# Patient Record
Sex: FEMALE | Race: WHITE | NOT HISPANIC OR LATINO | URBAN - METROPOLITAN AREA
[De-identification: names, ages, dates, MRNs, and addresses within clinical notes are randomized per-mention and may not be internally consistent; named-entity substitution may affect disease eponyms.]

---

## 2018-03-01 ENCOUNTER — OUTPATIENT (OUTPATIENT)
Dept: OUTPATIENT SERVICES | Facility: HOSPITAL | Age: 38
LOS: 1 days | End: 2018-03-01
Payer: MEDICAID

## 2018-03-01 PROCEDURE — G9001: CPT

## 2018-03-06 DIAGNOSIS — R69 ILLNESS, UNSPECIFIED: ICD-10-CM

## 2019-01-18 ENCOUNTER — INPATIENT (INPATIENT)
Facility: HOSPITAL | Age: 39
LOS: 4 days | Discharge: HOME | DRG: 871 | End: 2019-01-23
Attending: FAMILY MEDICINE | Admitting: FAMILY MEDICINE
Payer: MEDICAID

## 2019-01-18 VITALS
OXYGEN SATURATION: 94 % | HEART RATE: 130 BPM | SYSTOLIC BLOOD PRESSURE: 153 MMHG | DIASTOLIC BLOOD PRESSURE: 77 MMHG | WEIGHT: 190.04 LBS | RESPIRATION RATE: 18 BRPM | TEMPERATURE: 101 F

## 2019-01-18 DIAGNOSIS — J18.9 PNEUMONIA, UNSPECIFIED ORGANISM: ICD-10-CM

## 2019-01-18 LAB
ALBUMIN SERPL ELPH-MCNC: 2.5 G/DL — LOW (ref 3.3–5)
ALP SERPL-CCNC: 208 U/L — HIGH (ref 40–120)
ALT FLD-CCNC: 95 U/L — HIGH (ref 12–78)
AMYLASE P1 CFR SERPL: 92 U/L — SIGNIFICANT CHANGE UP (ref 25–115)
ANION GAP SERPL CALC-SCNC: 7 MMOL/L — SIGNIFICANT CHANGE UP (ref 5–17)
APTT BLD: 38.2 SEC — HIGH (ref 28.5–37)
AST SERPL-CCNC: 155 U/L — HIGH (ref 15–37)
BASOPHILS # BLD AUTO: 0.08 K/UL — SIGNIFICANT CHANGE UP (ref 0–0.2)
BASOPHILS NFR BLD AUTO: 0.6 % — SIGNIFICANT CHANGE UP (ref 0–2)
BILIRUB SERPL-MCNC: 1.9 MG/DL — HIGH (ref 0.2–1.2)
BUN SERPL-MCNC: 11 MG/DL — SIGNIFICANT CHANGE UP (ref 7–23)
CALCIUM SERPL-MCNC: 8.9 MG/DL — SIGNIFICANT CHANGE UP (ref 8.5–10.1)
CHLORIDE SERPL-SCNC: 107 MMOL/L — SIGNIFICANT CHANGE UP (ref 96–108)
CO2 SERPL-SCNC: 26 MMOL/L — SIGNIFICANT CHANGE UP (ref 22–31)
CREAT SERPL-MCNC: 0.77 MG/DL — SIGNIFICANT CHANGE UP (ref 0.5–1.3)
EOSINOPHIL # BLD AUTO: 0.01 K/UL — SIGNIFICANT CHANGE UP (ref 0–0.5)
EOSINOPHIL NFR BLD AUTO: 0.1 % — SIGNIFICANT CHANGE UP (ref 0–6)
FLU A RESULT: SIGNIFICANT CHANGE UP
FLU A RESULT: SIGNIFICANT CHANGE UP
FLUAV AG NPH QL: SIGNIFICANT CHANGE UP
FLUBV AG NPH QL: SIGNIFICANT CHANGE UP
GLUCOSE SERPL-MCNC: 104 MG/DL — HIGH (ref 70–99)
HCG SERPL-ACNC: <1 MIU/ML — SIGNIFICANT CHANGE UP
HCT VFR BLD CALC: 42.9 % — SIGNIFICANT CHANGE UP (ref 34.5–45)
HGB BLD-MCNC: 14.6 G/DL — SIGNIFICANT CHANGE UP (ref 11.5–15.5)
IMM GRANULOCYTES NFR BLD AUTO: 0.8 % — SIGNIFICANT CHANGE UP (ref 0–1.5)
INR BLD: 1.34 RATIO — HIGH (ref 0.88–1.16)
LACTATE SERPL-SCNC: 1.2 MMOL/L — SIGNIFICANT CHANGE UP (ref 0.7–2)
LIDOCAIN IGE QN: 227 U/L — SIGNIFICANT CHANGE UP (ref 73–393)
LYMPHOCYTES # BLD AUTO: 1.28 K/UL — SIGNIFICANT CHANGE UP (ref 1–3.3)
LYMPHOCYTES # BLD AUTO: 9.7 % — LOW (ref 13–44)
MCHC RBC-ENTMCNC: 34 GM/DL — SIGNIFICANT CHANGE UP (ref 32–36)
MCHC RBC-ENTMCNC: 34 PG — SIGNIFICANT CHANGE UP (ref 27–34)
MCV RBC AUTO: 100 FL — SIGNIFICANT CHANGE UP (ref 80–100)
MONOCYTES # BLD AUTO: 1.12 K/UL — HIGH (ref 0–0.9)
MONOCYTES NFR BLD AUTO: 8.5 % — SIGNIFICANT CHANGE UP (ref 2–14)
NEUTROPHILS # BLD AUTO: 10.65 K/UL — HIGH (ref 1.8–7.4)
NEUTROPHILS NFR BLD AUTO: 80.3 % — HIGH (ref 43–77)
PLATELET # BLD AUTO: 144 K/UL — LOW (ref 150–400)
POTASSIUM SERPL-MCNC: 4.3 MMOL/L — SIGNIFICANT CHANGE UP (ref 3.5–5.3)
POTASSIUM SERPL-SCNC: 4.3 MMOL/L — SIGNIFICANT CHANGE UP (ref 3.5–5.3)
PROCALCITONIN SERPL-MCNC: 0.89 NG/ML — HIGH (ref 0–0.04)
PROT SERPL-MCNC: 7.8 G/DL — SIGNIFICANT CHANGE UP (ref 6–8.3)
PROTHROM AB SERPL-ACNC: 15.4 SEC — HIGH (ref 10–12.9)
RBC # BLD: 4.29 M/UL — SIGNIFICANT CHANGE UP (ref 3.8–5.2)
RBC # FLD: 13.6 % — SIGNIFICANT CHANGE UP (ref 10.3–14.5)
RSV RESULT: SIGNIFICANT CHANGE UP
RSV RNA RESP QL NAA+PROBE: SIGNIFICANT CHANGE UP
SODIUM SERPL-SCNC: 140 MMOL/L — SIGNIFICANT CHANGE UP (ref 135–145)
WBC # BLD: 13.24 K/UL — HIGH (ref 3.8–10.5)
WBC # FLD AUTO: 13.24 K/UL — HIGH (ref 3.8–10.5)

## 2019-01-18 PROCEDURE — 99285 EMERGENCY DEPT VISIT HI MDM: CPT

## 2019-01-18 PROCEDURE — 71045 X-RAY EXAM CHEST 1 VIEW: CPT | Mod: 26

## 2019-01-18 PROCEDURE — 93010 ELECTROCARDIOGRAM REPORT: CPT

## 2019-01-18 PROCEDURE — 74176 CT ABD & PELVIS W/O CONTRAST: CPT | Mod: 26

## 2019-01-18 PROCEDURE — 76705 ECHO EXAM OF ABDOMEN: CPT | Mod: 26

## 2019-01-18 PROCEDURE — 99223 1ST HOSP IP/OBS HIGH 75: CPT | Mod: GC,AI

## 2019-01-18 RX ORDER — VANCOMYCIN HCL 1 G
1000 VIAL (EA) INTRAVENOUS ONCE
Qty: 0 | Refills: 0 | Status: COMPLETED | OUTPATIENT
Start: 2019-01-18 | End: 2019-01-18

## 2019-01-18 RX ORDER — ONDANSETRON 8 MG/1
4 TABLET, FILM COATED ORAL ONCE
Qty: 0 | Refills: 0 | Status: COMPLETED | OUTPATIENT
Start: 2019-01-18 | End: 2019-01-18

## 2019-01-18 RX ORDER — PIPERACILLIN AND TAZOBACTAM 4; .5 G/20ML; G/20ML
3.38 INJECTION, POWDER, LYOPHILIZED, FOR SOLUTION INTRAVENOUS ONCE
Qty: 0 | Refills: 0 | Status: COMPLETED | OUTPATIENT
Start: 2019-01-18 | End: 2019-01-18

## 2019-01-18 RX ORDER — PANTOPRAZOLE SODIUM 20 MG/1
40 TABLET, DELAYED RELEASE ORAL ONCE
Qty: 0 | Refills: 0 | Status: COMPLETED | OUTPATIENT
Start: 2019-01-18 | End: 2019-01-18

## 2019-01-18 RX ORDER — SODIUM CHLORIDE 9 MG/ML
2600 INJECTION, SOLUTION INTRAVENOUS ONCE
Qty: 0 | Refills: 0 | Status: COMPLETED | OUTPATIENT
Start: 2019-01-18 | End: 2019-01-18

## 2019-01-18 RX ORDER — ACETAMINOPHEN 500 MG
650 TABLET ORAL ONCE
Qty: 0 | Refills: 0 | Status: COMPLETED | OUTPATIENT
Start: 2019-01-18 | End: 2019-01-18

## 2019-01-18 RX ADMIN — SODIUM CHLORIDE 2600 MILLILITER(S): 9 INJECTION, SOLUTION INTRAVENOUS at 19:27

## 2019-01-18 RX ADMIN — Medication 250 MILLIGRAM(S): at 21:44

## 2019-01-18 RX ADMIN — ONDANSETRON 4 MILLIGRAM(S): 8 TABLET, FILM COATED ORAL at 20:35

## 2019-01-18 RX ADMIN — PANTOPRAZOLE SODIUM 40 MILLIGRAM(S): 20 TABLET, DELAYED RELEASE ORAL at 20:35

## 2019-01-18 RX ADMIN — PIPERACILLIN AND TAZOBACTAM 3.38 GRAM(S): 4; .5 INJECTION, POWDER, LYOPHILIZED, FOR SOLUTION INTRAVENOUS at 21:35

## 2019-01-18 RX ADMIN — SODIUM CHLORIDE 2600 MILLILITER(S): 9 INJECTION, SOLUTION INTRAVENOUS at 23:26

## 2019-01-18 RX ADMIN — Medication 650 MILLIGRAM(S): at 23:26

## 2019-01-18 RX ADMIN — PIPERACILLIN AND TAZOBACTAM 200 GRAM(S): 4; .5 INJECTION, POWDER, LYOPHILIZED, FOR SOLUTION INTRAVENOUS at 19:39

## 2019-01-18 RX ADMIN — Medication 650 MILLIGRAM(S): at 19:39
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
## 2019-01-18 NOTE — H&P ADULT - ASSESSMENT
The patient is a 39 yo FM with PMH of alcohol abuse, polysubstance abuse, HTN, and Hepatitis C (untreated) who was sent from [facility] for fever and tachycardia. Admitted for PNA.

## 2019-01-18 NOTE — H&P ADULT - PROBLEM SELECTOR PLAN 1
-patient with imaging evidence of PNA and red emesis, possible aspiration and klebsiella PNA, also with history of abd pain in setting of etoh/drug abuse and HCV  -continue vanc/zosyn  -patient tachycardic, will monitor on remote tele -patient with imaging evidence of PNA and red emesis, possible aspiration and klebsiella PNA, also with history of abd pain in setting of etoh/drug abuse and HCV  -continue vanc/zosyn  -patient tachycardic, will monitor on remote tele  -75 cc/hr NS maintenance

## 2019-01-18 NOTE — ED ADULT NURSE NOTE - NS ED NURSE TRANSPORT WITH
Cardiac Monitor/Defib/ACLS/Rescue Kit/O2/BVM oxygen/Cardiac Monitor/Defib/ACLS/Rescue Kit/O2/BVM/pulse ox

## 2019-01-18 NOTE — H&P ADULT - PROBLEM SELECTOR PLAN 4
-addiction medicine consult - Dr. Jensen  -continue methadone 10 mg qAM  -dulcolax for constipation  -nicotine patch

## 2019-01-18 NOTE — H&P ADULT - PROBLEM SELECTOR PLAN 2
-on ativan 2 mg tab q8 at [facility], will continue  -Ativan PRN via CIWA protocol  -folic acid daily, multi vitamin.

## 2019-01-18 NOTE — H&P ADULT - PROBLEM SELECTOR PLAN 5
-patient with history of Hep C, initially complaining of abd pain  -LFT abnormalities, gall stones, and hepatosplenomegaly  -GI consult - [facility]  -f/u am lactulose  -PPI, tums, mylanta

## 2019-01-18 NOTE — H&P ADULT - NSHPSOCIALHISTORY_GEN_ALL_CORE
Lives with fiance and 3 children, Patient has a history of etoh, cocaine, heroin abuse. Ambulates independently. Smokes 1 PPD

## 2019-01-18 NOTE — ED PROVIDER NOTE - CONSTITUTIONAL, MLM
normal... Disheveled appearing obese white female, well nourished, lethargic in mild distress apparent distress.

## 2019-01-18 NOTE — ED PROVIDER NOTE - OBJECTIVE STATEMENT
37 yo white female with H/O Opiate and Alcohol Abuse, HTN and Hep C who was sent here from [facility] (where she has been in rehab for the past few days) for fever and rapid heart rate. Per aide with patient the only thing that she has noted was that patient has been coughing a little today. Patient was heavily sedated with Ativan today per EMS making history taking difficult. Patient at this time only seems to mumble that here abdomen hurts. Additional history at this time unavailable as a result of sedation.

## 2019-01-18 NOTE — ED PROVIDER NOTE - ENMT, MLM
Patient Education        Bacterial Vaginosis: Care Instructions  Your Care Instructions    Bacterial vaginosis is a type of vaginal infection. It is caused by excess growth of certain bacteria that are normally found in the vagina. Symptoms can include itching, swelling, pain when you urinate or have sex, and a gray or yellow discharge with a \"fishy\" odor. It is not considered an infection that is spread through sexual contact. Although symptoms can be annoying and uncomfortable, bacterial vaginosis does not usually cause other health problems. However, if you have it while you are pregnant, it can cause complications. While the infection may go away on its own, most doctors use antibiotics to treat it. You may have been prescribed pills or vaginal cream. With treatment, bacterial vaginosis usually clears up in 5 to 7 days. Follow-up care is a key part of your treatment and safety. Be sure to make and go to all appointments, and call your doctor if you are having problems. It's also a good idea to know your test results and keep a list of the medicines you take. How can you care for yourself at home? · Take your antibiotics as directed. Do not stop taking them just because you feel better. You need to take the full course of antibiotics. · Do not eat or drink anything that contains alcohol if you are taking metronidazole (Flagyl). · Keep using your medicine if you start your period. Use pads instead of tampons while using a vaginal cream or suppository. Tampons can absorb the medicine. · Wear loose cotton clothing. Do not wear nylon and other materials that hold body heat and moisture close to the skin. · Do not scratch. Relieve itching with a cold pack or a cool bath. · Do not wash your vaginal area more than once a day. Use plain water or a mild, unscented soap. Do not douche. When should you call for help?   Watch closely for changes in your health, and be sure to contact your doctor if:    · You have unexpected vaginal bleeding.     · You have a fever.     · You have new or increased pain in your vagina or pelvis.     · You are not getting better after 1 week.     · Your symptoms return after you finish the course of your medicine. Where can you learn more? Go to http://dee-chuy.info/. Kevin Harrell in the search box to learn more about \"Bacterial Vaginosis: Care Instructions. \"  Current as of: May 15, 2018  Content Version: 11.8  © 0205-9983 [employer]. Care instructions adapted under license by [employer] (which disclaims liability or warranty for this information). If you have questions about a medical condition or this instruction, always ask your healthcare professional. [address] any warranty or liability for your use of this information. Patient Education        Urinary Tract Infection in Pregnancy: Care Instructions  Your Care Instructions    A urinary tract infection, or UTI, is an infection of the bladder and other urinary structures. Most UTIs occur in the bladder. They often cause pain or burning when you urinate. UTI is the most common bacterial infection in pregnancy. If untreated, a UTI could lead to problems such as a kidney infection or  labor. Most UTIs can be cured with antibiotics. Your doctor will prescribe an antibiotic that is safe during pregnancy. Be sure to finish your medicine so that the infection does not spread to your kidneys. Follow-up care is a key part of your treatment and safety. Be sure to make and go to all appointments, and call your doctor if you are having problems. It's also a good idea to know your test results and keep a list of the medicines you take. How can you care for yourself at home? · Take your antibiotics as directed. Do not stop taking them just because you feel better. You need to take the full course of antibiotics.   · Drink extra water and other fluids for the next day or two. This will help wash out the bacteria causing the infection. If you have kidney, heart, or liver disease and have to limit fluids, talk with your doctor before you increase the amount of fluids you drink. · Do not drink alcohol. · Urinate often. Try to empty your bladder each time. Preventing UTIs  · Drink plenty of fluids, enough so that your urine is light yellow or clear like water. This helps you urinate often, which clears bacteria from your system. If you have kidney, heart, or liver disease and have to limit fluids, talk with your doctor before you increase the amount of fluids you drink. · Urinate when you first have the urge. · Urinate right after you have sex. This is the best way for women to avoid UTIs. · When going to the bathroom, wipe from front to back to keep bacteria from entering the vagina or urethra. When should you call for help? Call your doctor now or seek immediate medical care if:    · You have symptoms of a worse urinary tract infection. These may include:  ? Pain or burning when you urinate. ? A frequent need to urinate without being able to pass much urine. ? Pain in the flank, which is just below the rib cage and above the waist on either side of the back. ? Blood in your urine. ? A fever.    Watch closely for changes in your health, and be sure to contact your doctor if:    · You do not get better as expected. Where can you learn more? Go to http://dee-chuy.info/. Enter M982 in the search box to learn more about \"Urinary Tract Infection in Pregnancy: Care Instructions. \"  Current as of: November 21, 2017  Content Version: 11.8  © 4894-6695 [employer]. Care instructions adapted under license by [employer] (which disclaims liability or warranty for this information).  If you have questions about a medical condition or this instruction, always ask your healthcare professional. [employer] any warranty or liability for your use of this information. [employer]. Poor oral hygiene

## 2019-01-18 NOTE — H&P ADULT - NSHPPHYSICALEXAM_GEN_ALL_CORE
Physical Exam:  General: lethargic, sedated, unkempt and disheveled female, no acute distress  HEENT: normocephallic atraumatic, PERRLA, EOMI b/l, moist mucous membranes   Neck: supple, nontender, no mass  Neurology: AOx1, sensation intact  Respiratory: decreased BS, no wheezes, rales, or rhonchi  CV: regular rate and rhythm, no murmurs, rubs or gallops  Abdominal: obese, soft, non-tender, decreased bowel sounds  Extremities: No clubbing, cyanosis or edema, positive peripheral pulses  Musculoskeletal: normal range of motion, no joint erythema or warmth, no joint swelling   Skin: multiple excoriations, acne, diffuse scaring, warm, dry Physical Exam:  General: lethargic, sedated, unkempt and disheveled female, no acute distress  HEENT: perioral/chapped/cracked lips, normocephallic atraumatic, PERRLA, EOMI b/l, moist mucous membranes   Neck: supple, nontender, no mass  Neurology: AOx1, sensation intact  Respiratory: decreased BS, no wheezes, rales, or rhonchi  CV: regular rate and rhythm, no murmurs, rubs or gallops  Abdominal: obese, soft, non-tender, decreased bowel sounds  Extremities: No clubbing, cyanosis or edema, positive peripheral pulses  Musculoskeletal: normal range of motion, no joint erythema or warmth, no joint swelling   Skin: multiple excoriations, acne, diffuse scaring, warm, dry

## 2019-01-18 NOTE — H&P ADULT - NSHPREVIEWOFSYSTEMS_GEN_ALL_CORE
ROS limited given patient mental status/sedation and refusal to cooperate with interviewer    Patient endorses red emesis x1, ED RN endorses black emesis x1  Patient denies cp, sob, wheeze, fever, chills, abd pain, diarrhea

## 2019-01-19 DIAGNOSIS — Z29.9 ENCOUNTER FOR PROPHYLACTIC MEASURES, UNSPECIFIED: ICD-10-CM

## 2019-01-19 DIAGNOSIS — I10 ESSENTIAL (PRIMARY) HYPERTENSION: ICD-10-CM

## 2019-01-19 DIAGNOSIS — B19.20 UNSPECIFIED VIRAL HEPATITIS C WITHOUT HEPATIC COMA: ICD-10-CM

## 2019-01-19 DIAGNOSIS — B18.2 CHRONIC VIRAL HEPATITIS C: ICD-10-CM

## 2019-01-19 DIAGNOSIS — J18.9 PNEUMONIA, UNSPECIFIED ORGANISM: ICD-10-CM

## 2019-01-19 DIAGNOSIS — F10.10 ALCOHOL ABUSE, UNCOMPLICATED: ICD-10-CM

## 2019-01-19 DIAGNOSIS — F19.10 OTHER PSYCHOACTIVE SUBSTANCE ABUSE, UNCOMPLICATED: ICD-10-CM

## 2019-01-19 LAB
ALBUMIN SERPL ELPH-MCNC: 2.3 G/DL — LOW (ref 3.3–5)
ALBUMIN SERPL ELPH-MCNC: 2.3 G/DL — LOW (ref 3.3–5)
ALP SERPL-CCNC: 160 U/L — HIGH (ref 40–120)
ALP SERPL-CCNC: 166 U/L — HIGH (ref 40–120)
ALT FLD-CCNC: 74 U/L — SIGNIFICANT CHANGE UP (ref 12–78)
ALT FLD-CCNC: 85 U/L — HIGH (ref 12–78)
AMMONIA BLD-MCNC: 81 UMOL/L — HIGH (ref 11–32)
ANION GAP SERPL CALC-SCNC: 4 MMOL/L — LOW (ref 5–17)
ANION GAP SERPL CALC-SCNC: 5 MMOL/L — SIGNIFICANT CHANGE UP (ref 5–17)
AST SERPL-CCNC: 106 U/L — HIGH (ref 15–37)
AST SERPL-CCNC: 124 U/L — HIGH (ref 15–37)
BILIRUB DIRECT SERPL-MCNC: 1.2 MG/DL — HIGH (ref 0.05–0.2)
BILIRUB INDIRECT FLD-MCNC: 0.5 MG/DL — SIGNIFICANT CHANGE UP (ref 0.2–1)
BILIRUB SERPL-MCNC: 1.6 MG/DL — HIGH (ref 0.2–1.2)
BILIRUB SERPL-MCNC: 1.7 MG/DL — HIGH (ref 0.2–1.2)
BUN SERPL-MCNC: 12 MG/DL — SIGNIFICANT CHANGE UP (ref 7–23)
BUN SERPL-MCNC: 14 MG/DL — SIGNIFICANT CHANGE UP (ref 7–23)
CALCIUM SERPL-MCNC: 8.5 MG/DL — SIGNIFICANT CHANGE UP (ref 8.5–10.1)
CALCIUM SERPL-MCNC: 8.6 MG/DL — SIGNIFICANT CHANGE UP (ref 8.5–10.1)
CHLORIDE SERPL-SCNC: 107 MMOL/L — SIGNIFICANT CHANGE UP (ref 96–108)
CHLORIDE SERPL-SCNC: 109 MMOL/L — HIGH (ref 96–108)
CO2 SERPL-SCNC: 28 MMOL/L — SIGNIFICANT CHANGE UP (ref 22–31)
CO2 SERPL-SCNC: 28 MMOL/L — SIGNIFICANT CHANGE UP (ref 22–31)
CREAT SERPL-MCNC: 0.66 MG/DL — SIGNIFICANT CHANGE UP (ref 0.5–1.3)
CREAT SERPL-MCNC: 0.7 MG/DL — SIGNIFICANT CHANGE UP (ref 0.5–1.3)
GLUCOSE SERPL-MCNC: 107 MG/DL — HIGH (ref 70–99)
GLUCOSE SERPL-MCNC: 99 MG/DL — SIGNIFICANT CHANGE UP (ref 70–99)
MAGNESIUM SERPL-MCNC: 1.7 MG/DL — SIGNIFICANT CHANGE UP (ref 1.6–2.6)
PHOSPHATE SERPL-MCNC: 3.3 MG/DL — SIGNIFICANT CHANGE UP (ref 2.5–4.5)
POTASSIUM SERPL-MCNC: 4.1 MMOL/L — SIGNIFICANT CHANGE UP (ref 3.5–5.3)
POTASSIUM SERPL-MCNC: 4.6 MMOL/L — SIGNIFICANT CHANGE UP (ref 3.5–5.3)
POTASSIUM SERPL-SCNC: 4.1 MMOL/L — SIGNIFICANT CHANGE UP (ref 3.5–5.3)
POTASSIUM SERPL-SCNC: 4.6 MMOL/L — SIGNIFICANT CHANGE UP (ref 3.5–5.3)
PROT SERPL-MCNC: 7.1 G/DL — SIGNIFICANT CHANGE UP (ref 6–8.3)
PROT SERPL-MCNC: 7.2 G/DL — SIGNIFICANT CHANGE UP (ref 6–8.3)
SODIUM SERPL-SCNC: 139 MMOL/L — SIGNIFICANT CHANGE UP (ref 135–145)
SODIUM SERPL-SCNC: 142 MMOL/L — SIGNIFICANT CHANGE UP (ref 135–145)

## 2019-01-19 PROCEDURE — 99233 SBSQ HOSP IP/OBS HIGH 50: CPT

## 2019-01-19 PROCEDURE — 93010 ELECTROCARDIOGRAM REPORT: CPT

## 2019-01-19 RX ORDER — PANTOPRAZOLE SODIUM 20 MG/1
40 TABLET, DELAYED RELEASE ORAL
Qty: 0 | Refills: 0 | Status: DISCONTINUED | OUTPATIENT
Start: 2019-01-19 | End: 2019-01-23

## 2019-01-19 RX ORDER — LACTULOSE 10 G/15ML
10 SOLUTION ORAL THREE TIMES A DAY
Qty: 0 | Refills: 0 | Status: DISCONTINUED | OUTPATIENT
Start: 2019-01-19 | End: 2019-01-19

## 2019-01-19 RX ORDER — LACTULOSE 10 G/15ML
SOLUTION ORAL
Qty: 0 | Refills: 0 | Status: DISCONTINUED | OUTPATIENT
Start: 2019-01-19 | End: 2019-01-19

## 2019-01-19 RX ORDER — FOLIC ACID 0.8 MG
1 TABLET ORAL DAILY
Qty: 0 | Refills: 0 | Status: DISCONTINUED | OUTPATIENT
Start: 2019-01-19 | End: 2019-01-19

## 2019-01-19 RX ORDER — NICOTINE POLACRILEX 2 MG
1 GUM BUCCAL DAILY
Qty: 0 | Refills: 0 | Status: DISCONTINUED | OUTPATIENT
Start: 2019-01-19 | End: 2019-01-23

## 2019-01-19 RX ORDER — LANOLIN ALCOHOL/MO/W.PET/CERES
3 CREAM (GRAM) TOPICAL ONCE
Qty: 0 | Refills: 0 | Status: COMPLETED | OUTPATIENT
Start: 2019-01-19 | End: 2019-01-19

## 2019-01-19 RX ORDER — SODIUM CHLORIDE 9 MG/ML
1000 INJECTION INTRAMUSCULAR; INTRAVENOUS; SUBCUTANEOUS
Qty: 0 | Refills: 0 | Status: DISCONTINUED | OUTPATIENT
Start: 2019-01-19 | End: 2019-01-20

## 2019-01-19 RX ORDER — VANCOMYCIN HCL 1 G
1000 VIAL (EA) INTRAVENOUS DAILY
Qty: 0 | Refills: 0 | Status: DISCONTINUED | OUTPATIENT
Start: 2019-01-19 | End: 2019-01-19

## 2019-01-19 RX ORDER — CALCIUM CARBONATE 500(1250)
1 TABLET ORAL DAILY
Qty: 0 | Refills: 0 | Status: DISCONTINUED | OUTPATIENT
Start: 2019-01-19 | End: 2019-01-23

## 2019-01-19 RX ORDER — METHADONE HYDROCHLORIDE 40 MG/1
10 TABLET ORAL
Qty: 0 | Refills: 0 | Status: DISCONTINUED | OUTPATIENT
Start: 2019-01-19 | End: 2019-01-22

## 2019-01-19 RX ORDER — PIPERACILLIN AND TAZOBACTAM 4; .5 G/20ML; G/20ML
3.38 INJECTION, POWDER, LYOPHILIZED, FOR SOLUTION INTRAVENOUS EVERY 8 HOURS
Qty: 0 | Refills: 0 | Status: DISCONTINUED | OUTPATIENT
Start: 2019-01-19 | End: 2019-01-22

## 2019-01-19 RX ORDER — LACTULOSE 10 G/15ML
10 SOLUTION ORAL ONCE
Qty: 0 | Refills: 0 | Status: COMPLETED | OUTPATIENT
Start: 2019-01-19 | End: 2019-01-19

## 2019-01-19 RX ORDER — LACTULOSE 10 G/15ML
10 SOLUTION ORAL THREE TIMES A DAY
Qty: 0 | Refills: 0 | Status: DISCONTINUED | OUTPATIENT
Start: 2019-01-19 | End: 2019-01-22

## 2019-01-19 RX ORDER — PETROLATUM,WHITE
1 JELLY (GRAM) TOPICAL
Qty: 0 | Refills: 0 | Status: DISCONTINUED | OUTPATIENT
Start: 2019-01-19 | End: 2019-01-23

## 2019-01-19 RX ADMIN — LACTULOSE 10 GRAM(S): 10 SOLUTION ORAL at 06:56

## 2019-01-19 RX ADMIN — PIPERACILLIN AND TAZOBACTAM 25 GRAM(S): 4; .5 INJECTION, POWDER, LYOPHILIZED, FOR SOLUTION INTRAVENOUS at 21:19

## 2019-01-19 RX ADMIN — Medication 2 MILLIGRAM(S): at 07:57

## 2019-01-19 RX ADMIN — LACTULOSE 10 GRAM(S): 10 SOLUTION ORAL at 21:19

## 2019-01-19 RX ADMIN — Medication 2 MILLIGRAM(S): at 06:26

## 2019-01-19 RX ADMIN — Medication 250 MILLIGRAM(S): at 11:52

## 2019-01-19 RX ADMIN — Medication 1 APPLICATION(S): at 06:26

## 2019-01-19 RX ADMIN — PIPERACILLIN AND TAZOBACTAM 25 GRAM(S): 4; .5 INJECTION, POWDER, LYOPHILIZED, FOR SOLUTION INTRAVENOUS at 13:44

## 2019-01-19 RX ADMIN — Medication 2 MILLIGRAM(S): at 13:44

## 2019-01-19 RX ADMIN — LACTULOSE 10 GRAM(S): 10 SOLUTION ORAL at 02:01

## 2019-01-19 RX ADMIN — Medication 1 PATCH: at 19:00

## 2019-01-19 RX ADMIN — Medication 2 MILLIGRAM(S): at 21:19

## 2019-01-19 RX ADMIN — PANTOPRAZOLE SODIUM 40 MILLIGRAM(S): 20 TABLET, DELAYED RELEASE ORAL at 06:26

## 2019-01-19 RX ADMIN — Medication 1 TABLET(S): at 11:52

## 2019-01-19 RX ADMIN — LACTULOSE 10 GRAM(S): 10 SOLUTION ORAL at 13:44

## 2019-01-19 RX ADMIN — Medication 3 MILLIGRAM(S): at 22:03

## 2019-01-19 RX ADMIN — SODIUM CHLORIDE 75 MILLILITER(S): 9 INJECTION INTRAMUSCULAR; INTRAVENOUS; SUBCUTANEOUS at 02:01

## 2019-01-19 RX ADMIN — METHADONE HYDROCHLORIDE 10 MILLIGRAM(S): 40 TABLET ORAL at 06:26

## 2019-01-19 RX ADMIN — Medication 1 PATCH: at 11:53

## 2019-01-19 NOTE — CONSULT NOTE ADULT - PROBLEM SELECTOR RECOMMENDATION 9
pt needs urine tox - to screen for recr drugs, on Ativan for ETOH w/d and LUKE  ciwa scale and prn BZD / vitamins, MVI will suffice, nutrition  [facility] - opioids, nicotine, cocaine, smoking cess ed, counseling, cont Methadone at curr dose  on emp ABX for PNA - procalcitonin elevated, ct abd reviewed, wbc elevated - would change ABX to Augmentin  mg   will follow  discussed with patient

## 2019-01-19 NOTE — PROGRESS NOTE ADULT - SUBJECTIVE AND OBJECTIVE BOX
INTERVAL HPI/OVERNIGHT EVENTS: Patient seen and examined at bedside. No acute events overnight. This morning showing withdrawal symptoms shaking/tremors, but not much worse.     MEDICATIONS  (STANDING):  lactulose Syrup 10 Gram(s) Oral three times a day  LORazepam     Tablet 2 milliGRAM(s) Oral every 8 hours  methadone    Tablet 10 milliGRAM(s) Oral <User Schedule>  multivitamin 1 Tablet(s) Oral daily  nicotine - 21 mG/24Hr(s) Patch 1 patch Transdermal daily  pantoprazole    Tablet 40 milliGRAM(s) Oral before breakfast  petrolatum white Ointment 1 Application(s) Topical two times a day  piperacillin/tazobactam IVPB. 3.375 Gram(s) IV Intermittent every 8 hours  sodium chloride 0.9%. 1000 milliLiter(s) (75 mL/Hr) IV Continuous <Continuous>  vancomycin  IVPB 1000 milliGRAM(s) IV Intermittent daily    MEDICATIONS  (PRN):  aluminum hydroxide/magnesium hydroxide/simethicone Suspension 30 milliLiter(s) Oral every 4 hours PRN Dyspepsia  bisacodyl 5 milliGRAM(s) Oral every 12 hours PRN Constipation  calcium carbonate    500 mG (Tums) Chewable 1 Tablet(s) Chew daily PRN Indigestion  LORazepam   Injectable 2 milliGRAM(s) IV Push every 2 hours PRN CIWA-Ar score increase by 2 points and a total score of 7 or less  LORazepam   Injectable 2 milliGRAM(s) IV Push every 1 hour PRN CIWA-Ar score 8 or greater      Allergies    Allergy Status Unknown  No Known Allergies    Intolerances        CONSTITUTIONAL: No weakness, fevers or chills  RESPIRATORY: No cough, wheezing, hemoptysis; No shortness of breath  Cvs: No chest pain or palpitations  Gi: No abdominal pain. No nausea, vomiting, diarrhea or constipation. No melena or hematochezia.  Neuro: no weakness    All other review of systems is negative unless indicated above.    Vital Signs Last 24 Hrs  T(C): 37 (19 Jan 2019 12:40), Max: 38.5 (18 Jan 2019 17:18)  T(F): 98.6 (19 Jan 2019 12:40), Max: 101.3 (18 Jan 2019 17:18)  HR: 88 (19 Jan 2019 12:40) (88 - 130)  BP: 135/77 (19 Jan 2019 12:40) (120/60 - 153/77)  BP(mean): 89 (18 Jan 2019 23:39) (76 - 89)  RR: 19 (19 Jan 2019 12:40) (16 - 19)  SpO2: 95% (19 Jan 2019 12:40) (92% - 95%)    General: NAD  Neurology: A&Ox3 , nonfocal, + tremors   Respiratory: coarse breath sounds   CV: RRR, S1S2  Abdominal: Soft, NT, ND +BS  Extremities: No edema, + peripheral pulses      LABS:                        11.7   9.13  )-----------( 106      ( 19 Jan 2019 10:38 )             35.2     01-19    142  |  109<H>  |  12  ----------------------------<  99  4.1   |  28  |  0.66    Ca    8.6      19 Jan 2019 09:53  Phos  3.3     01-19  Mg     1.7     01-19    TPro  7.1  /  Alb  2.3<L>  /  TBili  1.6<H>  /  DBili  x   /  AST  106<H>  /  ALT  74  /  AlkPhos  160<H>  01-19    PT/INR - ( 18 Jan 2019 18:58 )   PT: 15.4 sec;   INR: 1.34 ratio         PTT - ( 18 Jan 2019 18:58 )  PTT:38.2 sec      RADIOLOGY & ADDITIONAL TESTS:

## 2019-01-19 NOTE — CONSULT NOTE ADULT - SUBJECTIVE AND OBJECTIVE BOX
Date/Time Patient Seen:  		  Referring MD:   Data Reviewed	       Patient is a 38y old  Female who presents with a chief complaint of Pneumonia, Detox (18 Jan 2019 23:14)      Subjective/HPI    in bed  seen and examined  vs and meds reviewed  H and P reviewed  ER provider note reviewed    labs and imaging reviewed    H&P Adult [Charted Location: \[facility] [place] [facility]\"" 1EAS 101 W1] [Authored: 18-Jan-2019 23:14]- for Visit: [id number], Complete, Revised, Signed in Full, General    History and Physical:   Source of Information	Chart(s), Patient  Outpatient Providers	PMD: Diego       History of Present Illness:  Reason for Admission: Pneumonia, Detox  History of Present Illness:   The patient is a 39 yo FM with PMH of alcohol abuse, polysubstance abuse, HTN, and Hepatitis C (untreated) who was sent from [facility] for fever and tachycardia. The patient was brought into [facility] by her fiance for detox from alcohol. As per chart the patient has a history of heroin and cocaine use, and has been on methadone maintenance since age 20. Prior to admission to [place], the patient was noted to have a cough. The patient is an unreliable historian, but endorses that she had one episode of red emesis earlier today. Upon admission the ED the patient complained of abd pain, although was sedated on ativan at that time. Upon exam now, she denies abd pain.    In the ED:  T(F): 99.5  HR: 106   BP: 134/66  BP(mean): 89  RR: 16  SpO2: 95%    Labs: WBC 13.24, H-H wnl, plt 144, PTT 13.24, PT 15.4, INR 1.34, Lactate wnl, BUN/Cr wnl, Alb 2.5, Tbili 1.9, Alk Phos 208, , ALT 95, amylase/lipase wnl, bHCG wnl, procal .89, Flu neg, RSV neg.    EKG showing sinus tachy, EKG from [facility] NSR  CXR neg for acute cardiopulm path  CT Ab/pel showing left lower lobe atelectasis and peripheral infiltrates, hiatal hernia, hepatosplenomegaly, nonobstructing L renal calc  Gall U/S with cholelithiasis without cholecystitis    Patient received vanc/zosyn, 2.6 L LR bolus, tylenol, zofran, protonix     PAST MEDICAL & SURGICAL HISTORY:  Hepatitis C  Drug abuse  Alcohol abuse  Hypertension  No significant past surgical history        Medication list         MEDICATIONS  (STANDING):  folic acid 1 milliGRAM(s) Oral daily  lactulose Syrup 10 Gram(s) Oral three times a day  LORazepam     Tablet 2 milliGRAM(s) Oral every 8 hours  methadone    Tablet 10 milliGRAM(s) Oral <User Schedule>  multivitamin 1 Tablet(s) Oral daily  nicotine - 21 mG/24Hr(s) Patch 1 patch Transdermal daily  pantoprazole    Tablet 40 milliGRAM(s) Oral before breakfast  petrolatum white Ointment 1 Application(s) Topical two times a day  piperacillin/tazobactam IVPB. 3.375 Gram(s) IV Intermittent every 8 hours  sodium chloride 0.9%. 1000 milliLiter(s) (75 mL/Hr) IV Continuous <Continuous>  vancomycin  IVPB 1000 milliGRAM(s) IV Intermittent daily    MEDICATIONS  (PRN):  aluminum hydroxide/magnesium hydroxide/simethicone Suspension 30 milliLiter(s) Oral every 4 hours PRN Dyspepsia  bisacodyl 5 milliGRAM(s) Oral every 12 hours PRN Constipation  calcium carbonate    500 mG (Tums) Chewable 1 Tablet(s) Chew daily PRN Indigestion  LORazepam   Injectable 2 milliGRAM(s) IV Push every 2 hours PRN CIWA-Ar score increase by 2 points and a total score of 7 or less  LORazepam   Injectable 2 milliGRAM(s) IV Push every 1 hour PRN CIWA-Ar score 8 or greater         Vitals log        ICU Vital Signs Last 24 Hrs  T(C): 36.6 (19 Jan 2019 04:52), Max: 38.5 (18 Jan 2019 17:18)  T(F): 97.9 (19 Jan 2019 04:52), Max: 101.3 (18 Jan 2019 17:18)  HR: 99 (19 Jan 2019 04:52) (99 - 130)  BP: 127/81 (19 Jan 2019 04:52) (120/60 - 153/77)  BP(mean): 89 (18 Jan 2019 23:39) (76 - 89)  ABP: --  ABP(mean): --  RR: 18 (19 Jan 2019 04:52) (16 - 18)  SpO2: 92% (19 Jan 2019 04:52) (92% - 95%)           Input and Output:  I&O's Detail    18 Jan 2019 07:01  -  19 Jan 2019 07:00  --------------------------------------------------------  IN:    Oral Fluid: 480 mL    sodium chloride 0.9%.: 375 mL  Total IN: 855 mL    OUT:  Total OUT: 0 mL    Total NET: 855 mL          Lab Data                        14.6   13.24 )-----------( 144      ( 18 Jan 2019 17:51 )             42.9     01-19    139  |  107  |  14  ----------------------------<  107<H>  4.6   |  28  |  0.70    Ca    8.5      19 Jan 2019 00:53  Phos  3.3     01-19  Mg     1.7     01-19    TPro  7.2  /  Alb  2.3<L>  /  TBili  1.7<H>  /  DBili  1.20<H>  /  AST  124<H>  /  ALT  85<H>  /  AlkPhos  166<H>  01-19            Review of Systems	      Objective     Physical Examination    unkempt  heart s1s2  lung dec BS  on room air  cn grossly int  verbal  alert      Pertinent Lab findings & Imaging      Galile[place]:  NO   Adequate UO     I&O's Detail    18 Jan 2019 07:01  -  19 Jan 2019 07:00  --------------------------------------------------------  IN:    Oral Fluid: 480 mL    sodium chloride 0.9%.: 375 mL  Total IN: 855 mL    OUT:  Total OUT: 0 mL    Total NET: 855 mL               Discussed with:     Cultures:	        Radiology

## 2019-01-19 NOTE — CONSULT NOTE ADULT - PROBLEM SELECTOR RECOMMENDATION 9
u/s reviewed; no bilary pathology  ct shows hepatomegaly   chronic hep c; outpatient treatment  lfts daily  diet as tolerated

## 2019-01-19 NOTE — PROGRESS NOTE ADULT - PROBLEM SELECTOR PLAN 1
-continue vanc/zosyn for now  -patient tachycardic, will monitor on remote tele  -75 cc/hr NS maintenance -continue zosyn for now given ? aspiration, hold vanco for now  -repeat CXR in AM  -75 cc/hr NS maintenance

## 2019-01-19 NOTE — CONSULT NOTE ADULT - SUBJECTIVE AND OBJECTIVE BOX
[facility]  Jamie Zimmerman PA-C  [address] [place] [address]  [phone]      Chief Complaint:  Patient is a 38y old  Female who presents with a chief complaint of Pneumonia, Detox (2019 10:17)      HPI: The patient is a 39 yo FM with PMH of alcohol abuse, polysubstance abuse, HTN, and Hepatitis C (untreated) who was sent from [facility] for fever and tachycardia. The patient was brought into [facility] by her fiance for detox from alcohol. As per chart the patient has a history of heroin and cocaine use, and has been on methadone maintenance since age 20. Prior to admission to [place], the patient was noted to have a cough. The patient is an unreliable historian, but endorses that she had one episode of red emesis earlier today. Upon admission the ED the patient complained of abd pain, although was sedated on ativan at that time. Upon exam now, she denies abd pain.    Allergies:  Allergy Status Unknown  No Known Allergies      Medications:  aluminum hydroxide/magnesium hydroxide/simethicone Suspension 30 milliLiter(s) Oral every 4 hours PRN  bisacodyl 5 milliGRAM(s) Oral every 12 hours PRN  calcium carbonate    500 mG (Tums) Chewable 1 Tablet(s) Chew daily PRN  lactulose Syrup 10 Gram(s) Oral three times a day  LORazepam     Tablet 2 milliGRAM(s) Oral every 8 hours  LORazepam   Injectable 2 milliGRAM(s) IV Push every 2 hours PRN  LORazepam   Injectable 2 milliGRAM(s) IV Push every 1 hour PRN  methadone    Tablet 10 milliGRAM(s) Oral <User Schedule>  multivitamin 1 Tablet(s) Oral daily  nicotine - 21 mG/24Hr(s) Patch 1 patch Transdermal daily  pantoprazole    Tablet 40 milliGRAM(s) Oral before breakfast  petrolatum white Ointment 1 Application(s) Topical two times a day  piperacillin/tazobactam IVPB. 3.375 Gram(s) IV Intermittent every 8 hours  sodium chloride 0.9%. 1000 milliLiter(s) IV Continuous <Continuous>  vancomycin  IVPB 1000 milliGRAM(s) IV Intermittent daily      PMHX/PSHX:  Hepatitis C  Drug abuse  Alcohol abuse  Hypertension  No significant past surgical history      Family history:  No pertinent family history in first degree relatives      Social History:     ROS:     General:  No wt loss, fevers, chills, night sweats, fatigue,   Eyes:  Good vision, no reported pain  ENT:  No sore throat, pain, runny nose, dysphagia  CV:  No pain, palpitations, hypo/hypertension  Resp:  No dyspnea, cough, tachypnea, wheezing  GI:  No pain, No nausea, No vomiting, No diarrhea, No constipation, No weight loss, No fever, No pruritis, No rectal bleeding, No tarry stools, No dysphagia,  :  No pain, bleeding, incontinence, nocturia  Muscle:  No pain, weakness  Neuro:  No weakness, tingling, memory problems  Psych:  No fatigue, insomnia, mood problems, depression  Endocrine:  No polyuria, polydipsia, cold/heat intolerance  Heme:  No petechiae, ecchymosis, easy bruisability  Skin:  No rash, tattoos, scars, edema      PHYSICAL EXAM:   Vital Signs:  Vital Signs Last 24 Hrs  T(C): 37 (2019 12:40), Max: 38.5 (2019 17:18)  T(F): 98.6 (2019 12:40), Max: 101.3 (2019 17:18)  HR: 88 (2019 12:40) (88 - 130)  BP: 135/77 (2019 12:40) (120/60 - 153/77)  BP(mean): 89 (2019 23:39) (76 - 89)  RR: 19 (2019 12:40) (16 - 19)  SpO2: 95% (2019 12:40) (92% - 95%)  Daily     Daily Weight in k.6 (2019 00:37)    GENERAL:  Appears stated age, well-groomed, well-nourished, no distress  HEENT:  NC/AT,  conjunctivae clear and pink, no thyromegaly, nodules, adenopathy, no JVD, sclera -anicteric  CHEST:  Full & symmetric excursion, no increased effort, breath sounds clear  HEART:  Regular rhythm, S1, S2, no murmur/rub/S3/S4, no abdominal bruit, no edema  ABDOMEN:  Soft, non-tender, non-distended, normoactive bowel sounds,  no masses ,no hepato-splenomegaly, no signs of chronic liver disease  EXTEREMITIES:  no cyanosis,clubbing or edema  SKIN:  No rash/erythema/ecchymoses/petechiae/wounds/abscess/warm/dry  NEURO:  Alert, oriented, no asterixis, no tremor, no encephalopathy    LABS:                        11.7   9.13  )-----------( 106      ( 2019 10:38 )             35.2         142  |  109<H>  |  12  ----------------------------<  99  4.1   |  28  |  0.66    Ca    8.6      2019 09:53  Phos  3.3       Mg     1.7         TPro  7.1  /  Alb  2.3<L>  /  TBili  1.6<H>  /  DBili  x   /  AST  106<H>  /  ALT  74  /  AlkPhos  160<H>      LIVER FUNCTIONS - ( 2019 09:53 )  Alb: 2.3 g/dL / Pro: 7.1 g/dL / ALK PHOS: 160 U/L / ALT: 74 U/L / AST: 106 U/L / GGT: x           PT/INR - ( 2019 18:58 )   PT: 15.4 sec;   INR: 1.34 ratio         PTT - ( 2019 18:58 )  PTT:38.2 sec    Amylase Serum--      Lipase serum--       [id number]  Amylase [id number]      Lipase [id number]       Ammonia--      Imaging:

## 2019-01-20 LAB
ANION GAP SERPL CALC-SCNC: 5 MMOL/L — SIGNIFICANT CHANGE UP (ref 5–17)
BUN SERPL-MCNC: 12 MG/DL — SIGNIFICANT CHANGE UP (ref 7–23)
CALCIUM SERPL-MCNC: 8.3 MG/DL — LOW (ref 8.5–10.1)
CHLORIDE SERPL-SCNC: 108 MMOL/L — SIGNIFICANT CHANGE UP (ref 96–108)
CO2 SERPL-SCNC: 27 MMOL/L — SIGNIFICANT CHANGE UP (ref 22–31)
CREAT SERPL-MCNC: 0.77 MG/DL — SIGNIFICANT CHANGE UP (ref 0.5–1.3)
GLUCOSE SERPL-MCNC: 96 MG/DL — SIGNIFICANT CHANGE UP (ref 70–99)
MAGNESIUM SERPL-MCNC: 1.9 MG/DL — SIGNIFICANT CHANGE UP (ref 1.6–2.6)
PHOSPHATE SERPL-MCNC: 2.6 MG/DL — SIGNIFICANT CHANGE UP (ref 2.5–4.5)
POTASSIUM SERPL-MCNC: 4.3 MMOL/L — SIGNIFICANT CHANGE UP (ref 3.5–5.3)
POTASSIUM SERPL-SCNC: 4.3 MMOL/L — SIGNIFICANT CHANGE UP (ref 3.5–5.3)
SODIUM SERPL-SCNC: 140 MMOL/L — SIGNIFICANT CHANGE UP (ref 135–145)

## 2019-01-20 PROCEDURE — 99233 SBSQ HOSP IP/OBS HIGH 50: CPT

## 2019-01-20 PROCEDURE — 71045 X-RAY EXAM CHEST 1 VIEW: CPT | Mod: 26

## 2019-01-20 RX ADMIN — LACTULOSE 10 GRAM(S): 10 SOLUTION ORAL at 13:50

## 2019-01-20 RX ADMIN — Medication 1 PATCH: at 06:55

## 2019-01-20 RX ADMIN — PIPERACILLIN AND TAZOBACTAM 25 GRAM(S): 4; .5 INJECTION, POWDER, LYOPHILIZED, FOR SOLUTION INTRAVENOUS at 05:58

## 2019-01-20 RX ADMIN — Medication 2 MILLIGRAM(S): at 21:13

## 2019-01-20 RX ADMIN — Medication 1 TABLET(S): at 11:42

## 2019-01-20 RX ADMIN — Medication 2 MILLIGRAM(S): at 13:50

## 2019-01-20 RX ADMIN — PIPERACILLIN AND TAZOBACTAM 25 GRAM(S): 4; .5 INJECTION, POWDER, LYOPHILIZED, FOR SOLUTION INTRAVENOUS at 21:13

## 2019-01-20 RX ADMIN — METHADONE HYDROCHLORIDE 10 MILLIGRAM(S): 40 TABLET ORAL at 05:58

## 2019-01-20 RX ADMIN — SODIUM CHLORIDE 75 MILLILITER(S): 9 INJECTION INTRAMUSCULAR; INTRAVENOUS; SUBCUTANEOUS at 05:58

## 2019-01-20 RX ADMIN — PIPERACILLIN AND TAZOBACTAM 25 GRAM(S): 4; .5 INJECTION, POWDER, LYOPHILIZED, FOR SOLUTION INTRAVENOUS at 13:50

## 2019-01-20 RX ADMIN — Medication 1 PATCH: at 11:42

## 2019-01-20 RX ADMIN — Medication 2 MILLIGRAM(S): at 00:38

## 2019-01-20 RX ADMIN — Medication 2 MILLIGRAM(S): at 17:50

## 2019-01-20 RX ADMIN — Medication 1 PATCH: at 13:47

## 2019-01-20 RX ADMIN — LACTULOSE 10 GRAM(S): 10 SOLUTION ORAL at 05:58

## 2019-01-20 RX ADMIN — LACTULOSE 10 GRAM(S): 10 SOLUTION ORAL at 21:13

## 2019-01-20 RX ADMIN — Medication 2 MILLIGRAM(S): at 05:58

## 2019-01-20 RX ADMIN — Medication 1 PATCH: at 19:28

## 2019-01-20 RX ADMIN — PANTOPRAZOLE SODIUM 40 MILLIGRAM(S): 20 TABLET, DELAYED RELEASE ORAL at 05:59

## 2019-01-20 NOTE — PROGRESS NOTE ADULT - SUBJECTIVE AND OBJECTIVE BOX
Patient is a 38y old  Female who presents with a chief complaint of Pneumonia, Detox (20 Jan 2019 07:23)        HPI:  The patient is a 39 yo FM with PMH of alcohol abuse, polysubstance abuse, HTN, and Hepatitis C (untreated) who was sent from [facility] for fever and tachycardia. The patient was brought into [facility] by her fiance for detox from alcohol. As per chart the patient has a history of heroin and cocaine use, and has been on methadone maintenance since age 20. Prior to admission to [place], the patient was noted to have a cough. The patient is an unreliable historian, but endorses that she had one episode of red emesis earlier today. Upon admission the ED the patient complained of abd pain, although was sedated on ativan at that time. Upon exam now, she denies abd pain.    In the ED:  T(F): 99.5  HR: 106   BP: 134/66  BP(mean): 89  RR: 16  SpO2: 95%    Labs: WBC 13.24, H-H wnl, plt 144, PTT 13.24, PT 15.4, INR 1.34, Lactate wnl, BUN/Cr wnl, Alb 2.5, Tbili 1.9, Alk Phos 208, , ALT 95, amylase/lipase wnl, bHCG wnl, procal .89, Flu neg, RSV neg.    EKG showing sinus tachy, EKG from [facility] NSR  CXR neg for acute cardiopulm path  CT Ab/pel showing left lower lobe atelectasis and peripheral infiltrates, hiatal hernia, hepatosplenomegaly, nonobstructing L renal calc  Gall U/S with cholelithiasis without cholecystitis    Patient received vanc/zosyn, 2.6 L LR bolus, tylenol, zofran, protonix (18 Jan 2019 23:14)      SUBJECTIVE & OBJECTIVE: Pt seen and examined at bedside, no acute complaints     PHYSICAL EXAM:  T(C): 36.7 (01-20-19 @ 04:25), Max: 37 (01-19-19 @ 12:40)  HR: 87 (01-20-19 @ 04:25) (83 - 88)  BP: 143/90 (01-20-19 @ 04:25) (129/84 - 143/90)  RR: 14 (01-20-19 @ 04:25) (14 - 19)  SpO2: 93% (01-20-19 @ 04:25) (93% - 96%)  Wt(kg): --   GENERAL: NAD, well-groomed, well-developed  HEAD:  Atraumatic, Normocephalic  EYES: EOMI, PERRLA, conjunctiva and sclera clear  ENMT: Moist mucous membranes  NECK: Supple, No JVD  NERVOUS SYSTEM:  Alert & Oriented X3,   CHEST/LUNG: decrease air entr christ the bases   HEART: Regular rate and rhythm; No murmurs, rubs, or gallops  ABDOMEN: Soft, Nontender, Nondistended; Bowel sounds present  EXTREMITIES:  2+ Peripheral Pulses, No clubbing, cyanosis, or edema        MEDICATIONS  (STANDING):  lactulose Syrup 10 Gram(s) Oral three times a day  LORazepam     Tablet 2 milliGRAM(s) Oral every 8 hours  methadone    Tablet 10 milliGRAM(s) Oral <User Schedule>  multivitamin 1 Tablet(s) Oral daily  nicotine - 21 mG/24Hr(s) Patch 1 patch Transdermal daily  pantoprazole    Tablet 40 milliGRAM(s) Oral before breakfast  petrolatum white Ointment 1 Application(s) Topical two times a day  piperacillin/tazobactam IVPB. 3.375 Gram(s) IV Intermittent every 8 hours    MEDICATIONS  (PRN):  aluminum hydroxide/magnesium hydroxide/simethicone Suspension 30 milliLiter(s) Oral every 4 hours PRN Dyspepsia  bisacodyl 5 milliGRAM(s) Oral every 12 hours PRN Constipation  calcium carbonate    500 mG (Tums) Chewable 1 Tablet(s) Chew daily PRN Indigestion  LORazepam   Injectable 2 milliGRAM(s) IV Push every 2 hours PRN CIWA-Ar score increase by 2 points and a total score of 7 or less  LORazepam   Injectable 2 milliGRAM(s) IV Push every 1 hour PRN CIWA-Ar score 8 or greater      LABS:                        12.3   5.59  )-----------( 111      ( 20 Jan 2019 09:55 )             36.8     01-20    140  |  108  |  12  ----------------------------<  96  4.3   |  27  |  0.77    Ca    8.3<L>      20 Jan 2019 08:31  Phos  2.6     01-20  Mg     1.9     01-20    TPro  7.1  /  Alb  2.3<L>  /  TBili  1.6<H>  /  DBili  x   /  AST  106<H>  /  ALT  74  /  AlkPhos  160<H>  01-19    PT/INR - ( 18 Jan 2019 18:58 )   PT: 15.4 sec;   INR: 1.34 ratio         PTT - ( 18 Jan 2019 18:58 )  PTT:38.2 sec    Magnesium, Serum: 1.9 mg/dL (01-20 @ 08:31)    CAPILLARY BLOOD GLUCOSE          CAPILLARY BLOOD GLUCOSE        CAPILLARY BLOOD GLUCOSE                RECENT CULTURES:      RADIOLOGY & ADDITIONAL TESTS:                        DVT/GI ppx  Discussed with pt @ bedside

## 2019-01-20 NOTE — CHART NOTE - NSCHARTNOTEFT_GEN_A_CORE
Called by RN as patient requesting to leave AMA. Patient seen and examined. Answered patients questions about treatment and condition. Need for antibiotics explained and risks vs benefits of leaving AMA reviewed. Patient desires to return to [facility] for detox post hospitalization. Patient agrees to stay. Discussed with family at bedside. Late entry    Called by RN as patient requesting to leave AMA. Patient seen and examined. Answered patients questions about treatment and condition. Need for antibiotics explained and risks vs benefits of leaving AMA reviewed. Patient desires to return to [facility] for detox post hospitalization. Patient agrees to stay. Discussed with family at bedside.

## 2019-01-20 NOTE — PROGRESS NOTE ADULT - SUBJECTIVE AND OBJECTIVE BOX
Date/Time Patient Seen:  		  Referring MD:   Data Reviewed	       Patient is a 38y old  Female who presents with a chief complaint of Pneumonia, Detox (19 Jan 2019 15:10)      Subjective/HPI     PAST MEDICAL & SURGICAL HISTORY:  Hepatitis C  Drug abuse  Alcohol abuse  Hypertension  No significant past surgical history        Medication list         MEDICATIONS  (STANDING):  lactulose Syrup 10 Gram(s) Oral three times a day  LORazepam     Tablet 2 milliGRAM(s) Oral every 8 hours  methadone    Tablet 10 milliGRAM(s) Oral <User Schedule>  multivitamin 1 Tablet(s) Oral daily  nicotine - 21 mG/24Hr(s) Patch 1 patch Transdermal daily  pantoprazole    Tablet 40 milliGRAM(s) Oral before breakfast  petrolatum white Ointment 1 Application(s) Topical two times a day  piperacillin/tazobactam IVPB. 3.375 Gram(s) IV Intermittent every 8 hours  sodium chloride 0.9%. 1000 milliLiter(s) (75 mL/Hr) IV Continuous <Continuous>    MEDICATIONS  (PRN):  aluminum hydroxide/magnesium hydroxide/simethicone Suspension 30 milliLiter(s) Oral every 4 hours PRN Dyspepsia  bisacodyl 5 milliGRAM(s) Oral every 12 hours PRN Constipation  calcium carbonate    500 mG (Tums) Chewable 1 Tablet(s) Chew daily PRN Indigestion  LORazepam   Injectable 2 milliGRAM(s) IV Push every 2 hours PRN CIWA-Ar score increase by 2 points and a total score of 7 or less  LORazepam   Injectable 2 milliGRAM(s) IV Push every 1 hour PRN CIWA-Ar score 8 or greater         Vitals log        ICU Vital Signs Last 24 Hrs  T(C): 36.7 (20 Jan 2019 04:25), Max: 37 (19 Jan 2019 12:40)  T(F): 98 (20 Jan 2019 04:25), Max: 98.6 (19 Jan 2019 12:40)  HR: 87 (20 Jan 2019 04:25) (83 - 88)  BP: 143/90 (20 Jan 2019 04:25) (129/84 - 143/90)  BP(mean): 99 (19 Jan 2019 20:55) (99 - 99)  ABP: --  ABP(mean): --  RR: 14 (20 Jan 2019 04:25) (14 - 19)  SpO2: 93% (20 Jan 2019 04:25) (93% - 96%)           Input and Output:  I&O's Detail    19 Jan 2019 07:01  -  20 Jan 2019 07:00  --------------------------------------------------------  IN:    sodium chloride 0.9%.: 1800 mL    Solution: 250 mL    Solution: 100 mL  Total IN: 2150 mL    OUT:  Total OUT: 0 mL    Total NET: 2150 mL          Lab Data                        11.7   9.13  )-----------( 106      ( 19 Jan 2019 10:38 )             35.2     01-19    142  |  109<H>  |  12  ----------------------------<  99  4.1   |  28  |  0.66    Ca    8.6      19 Jan 2019 09:53  Phos  3.3     01-19  Mg     1.7     01-19    TPro  7.1  /  Alb  2.3<L>  /  TBili  1.6<H>  /  DBili  x   /  AST  106<H>  /  ALT  74  /  AlkPhos  160<H>  01-19            Review of Systems	      Objective     Physical Examination    heart s1s2  lung dec BS  abd soft  cn grossly int  moves all extr      Pertinent Lab findings & Imaging      Galilae:  NO   Adequate UO     I&O's Detail    19 Jan 2019 07:01  -  20 Jan 2019 07:00  --------------------------------------------------------  IN:    sodium chloride 0.9%.: 1800 mL    Solution: 250 mL    Solution: 100 mL  Total IN: 2150 mL    OUT:  Total OUT: 0 mL    Total NET: 2150 mL               Discussed with:     Cultures:	        Radiology

## 2019-01-20 NOTE — PROGRESS NOTE ADULT - PROBLEM SELECTOR PLAN 1
poly substance abuse  on ativan for etoh w/d  ciwa scale  smoking cess ed and counseling  on room air - sat WNL  cxr NAPD  on methadone for maintenance - hx of Opioid Use Disorder  plans on continuation of rehab at [facility] when medically optimized  no change in meds today, cont curr dose of Ativan, MVI,

## 2019-01-21 PROCEDURE — 99233 SBSQ HOSP IP/OBS HIGH 50: CPT

## 2019-01-21 PROCEDURE — 99232 SBSQ HOSP IP/OBS MODERATE 35: CPT

## 2019-01-21 RX ADMIN — LACTULOSE 10 GRAM(S): 10 SOLUTION ORAL at 21:34

## 2019-01-21 RX ADMIN — Medication 1 PATCH: at 11:57

## 2019-01-21 RX ADMIN — Medication 1 PATCH: at 19:42

## 2019-01-21 RX ADMIN — PIPERACILLIN AND TAZOBACTAM 25 GRAM(S): 4; .5 INJECTION, POWDER, LYOPHILIZED, FOR SOLUTION INTRAVENOUS at 21:34

## 2019-01-21 RX ADMIN — Medication 1 MILLIGRAM(S): at 23:51

## 2019-01-21 RX ADMIN — METHADONE HYDROCHLORIDE 10 MILLIGRAM(S): 40 TABLET ORAL at 05:58

## 2019-01-21 RX ADMIN — LACTULOSE 10 GRAM(S): 10 SOLUTION ORAL at 14:20

## 2019-01-21 RX ADMIN — Medication 2 MILLIGRAM(S): at 21:39

## 2019-01-21 RX ADMIN — PIPERACILLIN AND TAZOBACTAM 25 GRAM(S): 4; .5 INJECTION, POWDER, LYOPHILIZED, FOR SOLUTION INTRAVENOUS at 14:20

## 2019-01-21 RX ADMIN — Medication 1 MILLIGRAM(S): at 11:57

## 2019-01-21 RX ADMIN — Medication 1 MILLIGRAM(S): at 17:58

## 2019-01-21 RX ADMIN — PIPERACILLIN AND TAZOBACTAM 25 GRAM(S): 4; .5 INJECTION, POWDER, LYOPHILIZED, FOR SOLUTION INTRAVENOUS at 05:58

## 2019-01-21 RX ADMIN — LACTULOSE 10 GRAM(S): 10 SOLUTION ORAL at 05:58

## 2019-01-21 RX ADMIN — Medication 2 MILLIGRAM(S): at 05:58

## 2019-01-21 RX ADMIN — Medication 1 TABLET(S): at 11:57

## 2019-01-21 RX ADMIN — Medication 1 PATCH: at 06:38

## 2019-01-21 RX ADMIN — Medication 1 APPLICATION(S): at 17:57

## 2019-01-21 RX ADMIN — PANTOPRAZOLE SODIUM 40 MILLIGRAM(S): 20 TABLET, DELAYED RELEASE ORAL at 05:59

## 2019-01-21 NOTE — PROGRESS NOTE ADULT - SUBJECTIVE AND OBJECTIVE BOX
HPI:  The patient is a 39 yo FM with PMH of alcohol abuse, polysubstance abuse, HTN, and Hepatitis C (untreated) who was sent from [facility] for fever and tachycardia. The patient was brought into [facility] by her fiance for detox from alcohol. As per chart the patient has a history of heroin and cocaine use, and has been on methadone maintenance since age 20. Prior to admission to [place], the patient was noted to have a cough. The patient is an unreliable historian, but endorses that she had one episode of red emesis earlier today. Upon admission the ED the patient complained of abd pain, although was sedated on ativan at that time. Upon exam now, she denies abd pain.    In the ED:  T(F): 99.5  HR: 106   BP: 134/66  BP(mean): 89  RR: 16  SpO2: 95%    Labs: WBC 13.24, H-H wnl, plt 144, PTT 13.24, PT 15.4, INR 1.34, Lactate wnl, BUN/Cr wnl, Alb 2.5, Tbili 1.9, Alk Phos 208, , ALT 95, amylase/lipase wnl, bHCG wnl, procal .89, Flu neg, RSV neg.    EKG showing sinus tachy, EKG from [facility] NSR  CXR neg for acute cardiopulm path  CT Ab/pel showing left lower lobe atelectasis and peripheral infiltrates, hiatal hernia, hepatosplenomegaly, nonobstructing L renal calc  Gall U/S with cholelithiasis without cholecystitis    Patient received vanc/zosyn, 2.6 L LR bolus, tylenol, zofran, protonix (18 Jan 2019 23:14)    Detox: Chart reviewed  on Ativan has mild tremors  feels better  CIWA 5  On Methadone Maintenance states 90 mg daily       Allergies    Allergy Status Unknown  No Known Allergies    Intolerances        REVIEW OF SYSTEMS:    Constitutional: No fever, weight loss or fatigue  ENT:  No difficulty hearing, tinnitus, vertigo; No sinus or throat pain  Neck: No pain or stiffness  Respiratory: No cough, wheezing, chills or hemoptysis  Cardiovascular: No chest pain, palpitations, shortness of breath, dizziness or leg swelling  Gastrointestinal: No abdominal or epigastric pain. No nausea, vomiting or hematemesis; No diarrhea or constipation. No melena or hematochezia.  Neurological: No headaches, memory loss, loss of strength, numbness or tremors  Musculoskeletal: No joint pain or swelling; No muscle, back or extremity pain  Psychiatric: No depression, anxiety, mood swings or difficulty sleeping    MEDICATIONS  (STANDING):  lactulose Syrup 10 Gram(s) Oral three times a day  LORazepam     Tablet 2 milliGRAM(s) Oral every 8 hours  methadone    Tablet 10 milliGRAM(s) Oral <User Schedule>  multivitamin 1 Tablet(s) Oral daily  nicotine - 21 mG/24Hr(s) Patch 1 patch Transdermal daily  pantoprazole    Tablet 40 milliGRAM(s) Oral before breakfast  petrolatum white Ointment 1 Application(s) Topical two times a day  piperacillin/tazobactam IVPB. 3.375 Gram(s) IV Intermittent every 8 hours    MEDICATIONS  (PRN):  aluminum hydroxide/magnesium hydroxide/simethicone Suspension 30 milliLiter(s) Oral every 4 hours PRN Dyspepsia  bisacodyl 5 milliGRAM(s) Oral every 12 hours PRN Constipation  calcium carbonate    500 mG (Tums) Chewable 1 Tablet(s) Chew daily PRN Indigestion  LORazepam   Injectable 2 milliGRAM(s) IV Push every 2 hours PRN CIWA-Ar score increase by 2 points and a total score of 7 or less  LORazepam   Injectable 2 milliGRAM(s) IV Push every 1 hour PRN CIWA-Ar score 8 or greater      Vital Signs Last 24 Hrs  T(C): 36.9 (21 Jan 2019 04:29), Max: 36.9 (20 Jan 2019 20:43)  T(F): 98.4 (21 Jan 2019 04:29), Max: 98.5 (20 Jan 2019 20:43)  HR: 89 (21 Jan 2019 06:44) (76 - 97)  BP: 138/88 (21 Jan 2019 06:44) (128/81 - 154/100)  BP(mean): --  RR: 17 (21 Jan 2019 04:29) (17 - 17)  SpO2: 92% (21 Jan 2019 04:29) (91% - 94%)    PHYSICAL EXAM:    Constitutional: NAD, well-groomed, well-developed  HEENT: PERRLA, EOMI, Normal Hearing, MMM  Neck: No LAD, No JVD  Back: Normal spine flexure, No CVA tenderness  Respiratory: decreased breath sounds  Cardiovascular: S1 and S2, RRR, no M/G/R  Gastrointestinal: BS+, soft, NT/ND  Extremities: No peripheral edema  Vascular: 2+ peripheral pulses  Neurological: A/O x 3, no focal deficits mild tremor    LABS:                        12.3   5.59  )-----------( 111      ( 20 Jan 2019 09:55 )             36.8     01-20    140  |  108  |  12  ----------------------------<  96  4.3   |  27  |  0.77    Ca    8.3<L>      20 Jan 2019 08:31  Phos  2.6     01-20  Mg     1.9     01-20    TPro  7.1  /  Alb  2.3<L>  /  TBili  1.6<H>  /  DBili  x   /  AST  106<H>  /  ALT  74  /  AlkPhos  160<H>  01-19          RADIOLOGY & ADDITIONAL STUDIES:      Assessment and Plan:    Alcoholism:  Attempt to taper Ativan    Substance Abuse: Attending resident needs  to call Clinic to confirm dose of Methadone

## 2019-01-21 NOTE — PROGRESS NOTE ADULT - SUBJECTIVE AND OBJECTIVE BOX
Patient is a 38y old  Female who presents with a chief complaint of Pneumonia, Detox (21 Jan 2019 08:16)      INTERVAL HPI: Pt seen and examined. States she has her [facility] id card for methadone clinic but dosage form was left at [facility]. States she is feeling better.     OVERNIGHT EVENTS: none noted  T(F): 98.4 (01-21-19 @ 12:02), Max: 98.5 (01-20-19 @ 20:43)  HR: 83 (01-21-19 @ 12:02) (76 - 89)  BP: 95/68 (01-21-19 @ 12:02) (95/68 - 154/100)  RR: 17 (01-21-19 @ 12:02) (17 - 17)  SpO2: 92% (01-21-19 @ 12:02) (91% - 92%)  I&O's Summary    20 Jan 2019 07:01  -  21 Jan 2019 07:00  --------------------------------------------------------  IN: 375 mL / OUT: 0 mL / NET: 375 mL    21 Jan 2019 07:01  -  21 Jan 2019 14:40  --------------------------------------------------------  IN: 25 mL / OUT: 0 mL / NET: 25 mL        REVIEW OF SYSTEMS:  CONSTITUTIONAL: No fever, weight loss, or fatigue  RESPIRATORY: No cough, wheezing, chills or hemoptysis; No shortness of breath  CARDIOVASCULAR: No chest pain, palpitations, dizziness, or leg swelling  GASTROINTESTINAL: No abdominal or epigastric pain. No nausea, vomiting, or hematemesis; No diarrhea or constipation. No melena or hematochezia.  GENITOURINARY: No dysuria, frequency, hematuria, or incontinence  NEUROLOGICAL: No headaches, memory loss, loss of strength, numbness, or tremors  SKIN: No itching, burning, rashes, or lesions   MUSCULOSKELETAL: No joint pain or swelling; No muscle, back, or extremity pain  PSYCHIATRIC: No depression, anxiety, mood swings, or difficulty sleeping      PHYSICAL EXAM:  GENERAL: NAD, well-groomed, well-developed  HEAD:  Atraumatic, Normocephalic  EYES: EOMI, PERRLA, conjunctiva and sclera clear  ENMT: No tonsillar erythema, exudates, or enlargement; Moist mucous membranes, Good dentition, No lesions  NECK: Supple, No JVD, Normal thyroid  NERVOUS SYSTEM:  Alert & Oriented X3, Good concentration; Motor Strength 5/5 B/L upper and lower extremities; DTRs 2+ intact and symmetric  CHEST/LUNG: Clear to percussion bilaterally; No rales, rhonchi, wheezing, or rubs  HEART: Regular rate and rhythm; No murmurs, rubs, or gallops  ABDOMEN: Soft, Nontender, Nondistended; Bowel sounds present  EXTREMITIES:  2+ Peripheral Pulses, No clubbing, cyanosis, or edema  LYMPH: No lymphadenopathy noted  SKIN: No rashes or lesions    LABS:                        12.3   5.59  )-----------( 111      ( 20 Jan 2019 09:55 )             36.8     01-20    140  |  108  |  12  ----------------------------<  96  4.3   |  27  |  0.77    Ca    8.3<L>      20 Jan 2019 08:31  Phos  2.6     01-20  Mg     1.9     01-20          CAPILLARY BLOOD GLUCOSE          01-18 @ 22:11   No growth to date.  --  --          MEDICATIONS  (STANDING):  lactulose Syrup 10 Gram(s) Oral three times a day  LORazepam     Tablet 1 milliGRAM(s) Oral every 6 hours  methadone    Tablet 10 milliGRAM(s) Oral <User Schedule>  multivitamin 1 Tablet(s) Oral daily  nicotine - 21 mG/24Hr(s) Patch 1 patch Transdermal daily  pantoprazole    Tablet 40 milliGRAM(s) Oral before breakfast  petrolatum white Ointment 1 Application(s) Topical two times a day  piperacillin/tazobactam IVPB. 3.375 Gram(s) IV Intermittent every 8 hours    MEDICATIONS  (PRN):  aluminum hydroxide/magnesium hydroxide/simethicone Suspension 30 milliLiter(s) Oral every 4 hours PRN Dyspepsia  bisacodyl 5 milliGRAM(s) Oral every 12 hours PRN Constipation  calcium carbonate    500 mG (Tums) Chewable 1 Tablet(s) Chew daily PRN Indigestion  LORazepam   Injectable 2 milliGRAM(s) IV Push every 2 hours PRN CIWA-Ar score increase by 2 points and a total score of 7 or less  LORazepam   Injectable 2 milliGRAM(s) IV Push every 1 hour PRN CIWA-Ar score 8 or greater Patient is a 38y old  Female who presents with a chief complaint of Pneumonia, Detox (21 Jan 2019 08:16)      INTERVAL HPI: Pt seen and examined. States she has her paddy [facility] id card for methadone clinic but dosage form was left at [facility]. States she is feeling better.     OVERNIGHT EVENTS: none noted  T(F): 98.4 (01-21-19 @ 12:02), Max: 98.5 (01-20-19 @ 20:43)  HR: 83 (01-21-19 @ 12:02) (76 - 89)  BP: 95/68 (01-21-19 @ 12:02) (95/68 - 154/100)  RR: 17 (01-21-19 @ 12:02) (17 - 17)  SpO2: 92% (01-21-19 @ 12:02) (91% - 92%)  I&O's Summary    20 Jan 2019 07:01  -  21 Jan 2019 07:00  --------------------------------------------------------  IN: 375 mL / OUT: 0 mL / NET: 375 mL    21 Jan 2019 07:01  -  21 Jan 2019 14:40  --------------------------------------------------------  IN: 25 mL / OUT: 0 mL / NET: 25 mL        REVIEW OF SYSTEMS:  CONSTITUTIONAL: No fever, weight loss, or fatigue  RESPIRATORY: No cough, wheezing, chills or hemoptysis; No shortness of breath  CARDIOVASCULAR: No chest pain, palpitations, dizziness, or leg swelling  GASTROINTESTINAL: No abdominal or epigastric pain. No nausea, vomiting, or hematemesis; No diarrhea or constipation. No melena or hematochezia.  GENITOURINARY: No dysuria, frequency, hematuria, or incontinence  NEUROLOGICAL: No headaches, memory loss, loss of strength, numbness, or tremors  SKIN: No itching, burning, rashes, or lesions   MUSCULOSKELETAL: No joint pain or swelling; No muscle, back, or extremity pain  PSYCHIATRIC: No depression, anxiety, mood swings, or difficulty sleeping      PHYSICAL EXAM:  GENERAL: NAD, well-groomed, well-developed,   NERVOUS SYSTEM:  Alert & Oriented X3, Good concentration; Motor Strength 5/5 B/L upper and lower extremities; DTRs 2+ intact and symmetric  CHEST/LUNG: Clear to percussion bilaterally; No rales, rhonchi, wheezing, or rubs  HEART: Regular rate and rhythm; No murmurs, rubs, or gallops  ABDOMEN: Soft, Nontender, Nondistended; Bowel sounds present  EXTREMITIES:  2+ Peripheral Pulses, No clubbing, cyanosis, or edema  SKIN: No rashes or lesions    LABS:                        12.3   5.59  )-----------( 111      ( 20 Jan 2019 09:55 )             36.8     01-20    140  |  108  |  12  ----------------------------<  96  4.3   |  27  |  0.77    Ca    8.3<L>      20 Jan 2019 08:31  Phos  2.6     01-20  Mg     1.9     01-20          CAPILLARY BLOOD GLUCOSE          01-18 @ 22:11   No growth to date.  --  --          MEDICATIONS  (STANDING):  lactulose Syrup 10 Gram(s) Oral three times a day  LORazepam     Tablet 1 milliGRAM(s) Oral every 6 hours  methadone    Tablet 10 milliGRAM(s) Oral <User Schedule>  multivitamin 1 Tablet(s) Oral daily  nicotine - 21 mG/24Hr(s) Patch 1 patch Transdermal daily  pantoprazole    Tablet 40 milliGRAM(s) Oral before breakfast  petrolatum white Ointment 1 Application(s) Topical two times a day  piperacillin/tazobactam IVPB. 3.375 Gram(s) IV Intermittent every 8 hours    MEDICATIONS  (PRN):  aluminum hydroxide/magnesium hydroxide/simethicone Suspension 30 milliLiter(s) Oral every 4 hours PRN Dyspepsia  bisacodyl 5 milliGRAM(s) Oral every 12 hours PRN Constipation  calcium carbonate    500 mG (Tums) Chewable 1 Tablet(s) Chew daily PRN Indigestion  LORazepam   Injectable 2 milliGRAM(s) IV Push every 2 hours PRN CIWA-Ar score increase by 2 points and a total score of 7 or less  LORazepam   Injectable 2 milliGRAM(s) IV Push every 1 hour PRN CIWA-Ar score 8 or greater

## 2019-01-21 NOTE — PROGRESS NOTE ADULT - SUBJECTIVE AND OBJECTIVE BOX
INTERVAL HPI/OVERNIGHT EVENTS:  pt seen and examined  feels better  denies n/v/d/abd pain  per overnight rn +bm yesterday  afebrile overnight no new labs to assess    MEDICATIONS  (STANDING):  lactulose Syrup 10 Gram(s) Oral three times a day  LORazepam     Tablet 2 milliGRAM(s) Oral every 8 hours  methadone    Tablet 10 milliGRAM(s) Oral <User Schedule>  multivitamin 1 Tablet(s) Oral daily  nicotine - 21 mG/24Hr(s) Patch 1 patch Transdermal daily  pantoprazole    Tablet 40 milliGRAM(s) Oral before breakfast  petrolatum white Ointment 1 Application(s) Topical two times a day  piperacillin/tazobactam IVPB. 3.375 Gram(s) IV Intermittent every 8 hours    MEDICATIONS  (PRN):  aluminum hydroxide/magnesium hydroxide/simethicone Suspension 30 milliLiter(s) Oral every 4 hours PRN Dyspepsia  bisacodyl 5 milliGRAM(s) Oral every 12 hours PRN Constipation  calcium carbonate    500 mG (Tums) Chewable 1 Tablet(s) Chew daily PRN Indigestion  LORazepam   Injectable 2 milliGRAM(s) IV Push every 2 hours PRN CIWA-Ar score increase by 2 points and a total score of 7 or less  LORazepam   Injectable 2 milliGRAM(s) IV Push every 1 hour PRN CIWA-Ar score 8 or greater      Allergies    Allergy Status Unknown  No Known Allergies    Intolerances        Review of Systems:    General:  No wt loss, fevers, chills, night sweats, fatigue   Eyes:  Good vision, no reported pain  ENT:  No sore throat, pain, runny nose, dysphagia  CV:  No pain, palpitations, hypo/hypertension  Resp:  No dyspnea, cough, tachypnea, wheezing  GI:  No pain, No nausea, No vomiting, No diarrhea, No constipation, No weight loss, No fever, No pruritis, No rectal bleeding, No melena, No dysphagia  :  No pain, bleeding, incontinence, nocturia  Muscle:  No pain, weakness  Neuro:  No weakness, tingling, memory problems  Psych:  No fatigue, insomnia, mood problems, depression  Endocrine:  No polyuria, polydypsia, cold/heat intolerance  Heme:  No petechiae, ecchymosis, easy bruisability  Skin:  No rash, tattoos, scars, edema      Vital Signs Last 24 Hrs  T(C): 36.9 (21 Jan 2019 04:29), Max: 36.9 (20 Jan 2019 20:43)  T(F): 98.4 (21 Jan 2019 04:29), Max: 98.5 (20 Jan 2019 20:43)  HR: 89 (21 Jan 2019 06:44) (76 - 97)  BP: 138/88 (21 Jan 2019 06:44) (128/81 - 154/100)  BP(mean): --  RR: 17 (21 Jan 2019 04:29) (17 - 17)  SpO2: 92% (21 Jan 2019 04:29) (91% - 94%)    PHYSICAL EXAM:    Constitutional: NAD  HEENT: ncat  Neck: No LAD  Respiratory: dec bs  Cardiovascular: S1 and S2, RRR  Gastrointestinal: obese soft nt mild dt  Extremities: No peripheral edema  Vascular: 2+ peripheral pulses  Neurological: Awake alert responds appropriately  Skin: No rashes      LABS:                        12.3   5.59  )-----------( 111      ( 20 Jan 2019 09:55 )             36.8     01-20    140  |  108  |  12  ----------------------------<  96  4.3   |  27  |  0.77    Ca    8.3<L>      20 Jan 2019 08:31  Phos  2.6     01-20  Mg     1.9     01-20    TPro  7.1  /  Alb  2.3<L>  /  TBili  1.6<H>  /  DBili  x   /  AST  106<H>  /  ALT  74  /  AlkPhos  160<H>  01-19          RADIOLOGY & ADDITIONAL TESTS:

## 2019-01-21 NOTE — PROGRESS NOTE ADULT - PROBLEM SELECTOR PLAN 1
CT w hepatosplenomegaly  u/s reviewed, no bilary pathology  lfts overall downtrending  cont ppi, lactulose  check lfts daily, ordered for am  diet as tolerated  chronic hep c; will need outpatient treatment  will follow

## 2019-01-22 LAB
ALBUMIN SERPL ELPH-MCNC: 2.4 G/DL — LOW (ref 3.3–5)
ALP SERPL-CCNC: 202 U/L — HIGH (ref 40–120)
ALT FLD-CCNC: 155 U/L — HIGH (ref 12–78)
AMMONIA BLD-MCNC: 62 UMOL/L — HIGH (ref 11–32)
ANION GAP SERPL CALC-SCNC: 6 MMOL/L — SIGNIFICANT CHANGE UP (ref 5–17)
AST SERPL-CCNC: 245 U/L — HIGH (ref 15–37)
BASOPHILS # BLD AUTO: 0.05 K/UL — SIGNIFICANT CHANGE UP (ref 0–0.2)
BASOPHILS NFR BLD AUTO: 1 % — SIGNIFICANT CHANGE UP (ref 0–2)
BILIRUB SERPL-MCNC: 1.4 MG/DL — HIGH (ref 0.2–1.2)
BUN SERPL-MCNC: 7 MG/DL — SIGNIFICANT CHANGE UP (ref 7–23)
CALCIUM SERPL-MCNC: 8.8 MG/DL — SIGNIFICANT CHANGE UP (ref 8.5–10.1)
CHLORIDE SERPL-SCNC: 103 MMOL/L — SIGNIFICANT CHANGE UP (ref 96–108)
CO2 SERPL-SCNC: 30 MMOL/L — SIGNIFICANT CHANGE UP (ref 22–31)
CREAT SERPL-MCNC: 0.93 MG/DL — SIGNIFICANT CHANGE UP (ref 0.5–1.3)
EOSINOPHIL # BLD AUTO: 0.1 K/UL — SIGNIFICANT CHANGE UP (ref 0–0.5)
EOSINOPHIL NFR BLD AUTO: 2 % — SIGNIFICANT CHANGE UP (ref 0–6)
GLUCOSE SERPL-MCNC: 126 MG/DL — HIGH (ref 70–99)
HCT VFR BLD CALC: 38.4 % — SIGNIFICANT CHANGE UP (ref 34.5–45)
HGB BLD-MCNC: 12.8 G/DL — SIGNIFICANT CHANGE UP (ref 11.5–15.5)
IMM GRANULOCYTES NFR BLD AUTO: 0.4 % — SIGNIFICANT CHANGE UP (ref 0–1.5)
LYMPHOCYTES # BLD AUTO: 1.55 K/UL — SIGNIFICANT CHANGE UP (ref 1–3.3)
LYMPHOCYTES # BLD AUTO: 31.5 % — SIGNIFICANT CHANGE UP (ref 13–44)
MCHC RBC-ENTMCNC: 33.3 GM/DL — SIGNIFICANT CHANGE UP (ref 32–36)
MCHC RBC-ENTMCNC: 33.7 PG — SIGNIFICANT CHANGE UP (ref 27–34)
MCV RBC AUTO: 101.1 FL — HIGH (ref 80–100)
MONOCYTES # BLD AUTO: 0.42 K/UL — SIGNIFICANT CHANGE UP (ref 0–0.9)
MONOCYTES NFR BLD AUTO: 8.5 % — SIGNIFICANT CHANGE UP (ref 2–14)
NEUTROPHILS # BLD AUTO: 2.78 K/UL — SIGNIFICANT CHANGE UP (ref 1.8–7.4)
NEUTROPHILS NFR BLD AUTO: 56.6 % — SIGNIFICANT CHANGE UP (ref 43–77)
PLATELET # BLD AUTO: 132 K/UL — LOW (ref 150–400)
POTASSIUM SERPL-MCNC: 3.4 MMOL/L — LOW (ref 3.5–5.3)
POTASSIUM SERPL-SCNC: 3.4 MMOL/L — LOW (ref 3.5–5.3)
PROCALCITONIN SERPL-MCNC: 1.04 NG/ML — HIGH (ref 0–0.04)
PROT SERPL-MCNC: 7.5 G/DL — SIGNIFICANT CHANGE UP (ref 6–8.3)
RBC # BLD: 3.8 M/UL — SIGNIFICANT CHANGE UP (ref 3.8–5.2)
RBC # FLD: 13.2 % — SIGNIFICANT CHANGE UP (ref 10.3–14.5)
SODIUM SERPL-SCNC: 139 MMOL/L — SIGNIFICANT CHANGE UP (ref 135–145)
WBC # BLD: 4.92 K/UL — SIGNIFICANT CHANGE UP (ref 3.8–10.5)
WBC # FLD AUTO: 4.92 K/UL — SIGNIFICANT CHANGE UP (ref 3.8–10.5)

## 2019-01-22 PROCEDURE — 99232 SBSQ HOSP IP/OBS MODERATE 35: CPT

## 2019-01-22 PROCEDURE — 99233 SBSQ HOSP IP/OBS HIGH 50: CPT

## 2019-01-22 RX ORDER — LACTULOSE 10 G/15ML
15 SOLUTION ORAL THREE TIMES A DAY
Qty: 0 | Refills: 0 | Status: DISCONTINUED | OUTPATIENT
Start: 2019-01-22 | End: 2019-01-23

## 2019-01-22 RX ORDER — METHADONE HYDROCHLORIDE 40 MG/1
25 TABLET ORAL ONCE
Qty: 0 | Refills: 0 | Status: DISCONTINUED | OUTPATIENT
Start: 2019-01-22 | End: 2019-01-22

## 2019-01-22 RX ORDER — CALAMINE AND ZINC OXIDE AND PHENOL 160; 10 MG/ML; MG/ML
1 LOTION TOPICAL THREE TIMES A DAY
Qty: 0 | Refills: 0 | Status: DISCONTINUED | OUTPATIENT
Start: 2019-01-22 | End: 2019-01-23

## 2019-01-22 RX ORDER — METHADONE HYDROCHLORIDE 40 MG/1
45 TABLET ORAL
Qty: 0 | Refills: 0 | Status: DISCONTINUED | OUTPATIENT
Start: 2019-01-22 | End: 2019-01-22

## 2019-01-22 RX ORDER — METHADONE HYDROCHLORIDE 40 MG/1
45 TABLET ORAL
Qty: 0 | Refills: 0 | Status: DISCONTINUED | OUTPATIENT
Start: 2019-01-22 | End: 2019-01-23

## 2019-01-22 RX ORDER — METHADONE HYDROCHLORIDE 40 MG/1
10 TABLET ORAL ONCE
Qty: 0 | Refills: 0 | Status: DISCONTINUED | OUTPATIENT
Start: 2019-01-22 | End: 2019-01-22

## 2019-01-22 RX ORDER — SENNA PLUS 8.6 MG/1
2 TABLET ORAL AT BEDTIME
Qty: 0 | Refills: 0 | Status: DISCONTINUED | OUTPATIENT
Start: 2019-01-22 | End: 2019-01-22

## 2019-01-22 RX ORDER — POTASSIUM CHLORIDE 20 MEQ
40 PACKET (EA) ORAL ONCE
Qty: 0 | Refills: 0 | Status: COMPLETED | OUTPATIENT
Start: 2019-01-22 | End: 2019-01-22

## 2019-01-22 RX ORDER — DOCUSATE SODIUM 100 MG
100 CAPSULE ORAL THREE TIMES A DAY
Qty: 0 | Refills: 0 | Status: DISCONTINUED | OUTPATIENT
Start: 2019-01-22 | End: 2019-01-22

## 2019-01-22 RX ADMIN — METHADONE HYDROCHLORIDE 10 MILLIGRAM(S): 40 TABLET ORAL at 10:30

## 2019-01-22 RX ADMIN — Medication 1 PATCH: at 06:41

## 2019-01-22 RX ADMIN — Medication 40 MILLIEQUIVALENT(S): at 22:03

## 2019-01-22 RX ADMIN — Medication 1 MILLIGRAM(S): at 22:03

## 2019-01-22 RX ADMIN — Medication 1 TABLET(S): at 11:54

## 2019-01-22 RX ADMIN — PANTOPRAZOLE SODIUM 40 MILLIGRAM(S): 20 TABLET, DELAYED RELEASE ORAL at 05:50

## 2019-01-22 RX ADMIN — Medication 1 MILLIGRAM(S): at 05:49

## 2019-01-22 RX ADMIN — Medication 1 PATCH: at 18:10

## 2019-01-22 RX ADMIN — CALAMINE AND ZINC OXIDE AND PHENOL 1 APPLICATION(S): 160; 10 LOTION TOPICAL at 23:37

## 2019-01-22 RX ADMIN — LACTULOSE 15 GRAM(S): 10 SOLUTION ORAL at 13:49

## 2019-01-22 RX ADMIN — Medication 1 PATCH: at 11:56

## 2019-01-22 RX ADMIN — METHADONE HYDROCHLORIDE 10 MILLIGRAM(S): 40 TABLET ORAL at 05:49

## 2019-01-22 RX ADMIN — LACTULOSE 15 GRAM(S): 10 SOLUTION ORAL at 22:03

## 2019-01-22 RX ADMIN — Medication 1 PATCH: at 11:54

## 2019-01-22 RX ADMIN — METHADONE HYDROCHLORIDE 25 MILLIGRAM(S): 40 TABLET ORAL at 13:48

## 2019-01-22 RX ADMIN — LACTULOSE 10 GRAM(S): 10 SOLUTION ORAL at 05:49

## 2019-01-22 RX ADMIN — PIPERACILLIN AND TAZOBACTAM 25 GRAM(S): 4; .5 INJECTION, POWDER, LYOPHILIZED, FOR SOLUTION INTRAVENOUS at 05:51

## 2019-01-22 NOTE — PROGRESS NOTE ADULT - PROBLEM SELECTOR PLAN 6
-Padua=1  -SCDs for DVT ppx  -ambulate with assistance, fall precautions

## 2019-01-22 NOTE — PROGRESS NOTE ADULT - PROBLEM SELECTOR PLAN 1
CT w hepatosplenomegaly  u/s reviewed, no bilary pathology  lfts overall downtrending, f/u am labs  cont ppi, lactulose  trend lfts daily  diet as tolerated  chronic hep c; will need outpatient treatment  will follow

## 2019-01-22 NOTE — PROGRESS NOTE ADULT - ASSESSMENT
39 yo FM with PMH of alcohol abuse, polysubstance abuse, HTN, and Hepatitis C (untreated) who was sent from [facility] for fever and tachycardia. Admitted for PNA
The patient is a 37 yo FM with PMH of alcohol abuse, polysubstance abuse, HTN, and Hepatitis C (untreated) who was sent from [facility] for fever and tachycardia. Admitted for PNA.
The patient is a 37 yo FM with PMH of alcohol abuse, polysubstance abuse, HTN, and Hepatitis C (untreated) who was sent from [facility] for fever and tachycardia. Admitted for PNA.
39 yo FM with PMH of alcohol abuse, polysubstance abuse, HTN, and Hepatitis C (untreated) who was sent from [facility] for fever and tachycardia. Admitted for PNA

## 2019-01-22 NOTE — PROGRESS NOTE ADULT - PROBLEM SELECTOR PLAN 5
-patient with history of Hep C, initially complaining of abd pain  -LFT abnormalities, gall stones, and hepatosplenomegaly  -GI consult - [facility]  -f/u am lactulose  -PPI, tums, mylanta
-patient with history of Hep C, initially complaining of abd pain  -LFT abnormalities, gall stones, and hepatosplenomegaly  -GI consult - [facility]  -f/u am lactulose  -PPI, tums, mylanta
-patient with history of Hep C, initially complaining of abd pain  -LFT abnormalities, gall stones, and hepatosplenomegaly  -apprec GI recs  -f/u am lactulose  -PPI, tums, mylanta
-patient with history of Hep C, initially complaining of abd pain  -LFT abnormalities, gall stones, and hepatosplenomegaly  -apprec GI recs  -f/u am lactulose  -PPI, tums, mylanta

## 2019-01-22 NOTE — PROGRESS NOTE ADULT - PROBLEM SELECTOR PLAN 3
-not currently on meds  -vitals stable, will monitor

## 2019-01-22 NOTE — PROGRESS NOTE ADULT - PROBLEM SELECTOR PLAN 4
-addiction medicine consult - Dr. Jensen  -continue methadone 10 mg qAM  -dulcolax for constipation  -nicotine patch
-addiction medicine consult - Dr. Jensen  -continue methadone 10 mg qAM< called to confirm dose, were closed as she claims taking methadone 90   -dulcolax for constipation  -nicotine patch
-addiction medicine consult - Dr. Jensen  -methadone confirmed at [facility] as 90mg daily last dose was 1/15 by RN Sherin Boothe ph#[phone]
-addiction medicine consult - Dr. Jensen  -continue methadone 10 mg qAM-methadone clinic at [facility] called ph#[phone], msg left for callback, also called [facility] but detox floor states they do not have any paperwork confirming dose, will re-attempt tomorrow  -dulcolax for constipation  -nicotine patch

## 2019-01-22 NOTE — PROGRESS NOTE ADULT - SUBJECTIVE AND OBJECTIVE BOX
Patient is a 38y old  Female who presents with a chief complaint of Pneumonia, Detox (22 Jan 2019 08:16)      INTERVAL HPI: Pt seen and examined. States she is feeling slightly anxious and having some loose stools. States needs her methadone dose verified. Uses methadone clinic in [facility]    OVERNIGHT EVENTS: none noted  T(F): 97.8 (01-22-19 @ 21:11), Max: 98.3 (01-22-19 @ 04:54)  HR: 95 (01-22-19 @ 21:11) (83 - 100)  BP: 121/79 (01-22-19 @ 21:11) (121/79 - 126/79)  RR: 18 (01-22-19 @ 21:11) (17 - 18)  SpO2: 94% (01-22-19 @ 21:11) (94% - 97%)  I&O's Summary    21 Jan 2019 07:01  -  22 Jan 2019 07:00  --------------------------------------------------------  IN: 100 mL / OUT: 0 mL / NET: 100 mL        REVIEW OF SYSTEMS:  CONSTITUTIONAL: No fever, weight loss, or fatigue  RESPIRATORY: No cough, wheezing, chills or hemoptysis; No shortness of breath  CARDIOVASCULAR: No chest pain, palpitations, dizziness, or leg swelling  GASTROINTESTINAL: No abdominal or epigastric pain. No nausea, vomiting, or hematemesis; + diarrhea, no constipation. No melena or hematochezia.  GENITOURINARY: No dysuria, frequency, hematuria, or incontinence  NEUROLOGICAL: No headaches, memory loss, loss of strength, numbness, or tremors  SKIN: No itching, burning, rashes, or lesions   ENDOCRINE: No heat or cold intolerance; No hair loss  MUSCULOSKELETAL: No joint pain or swelling; No muscle, back, or extremity pain  PSYCHIATRIC: No depression, anxiety, mood swings, or difficulty sleeping      PHYSICAL EXAM:  GENERAL: NAD, well-groomed, well-developed  NERVOUS SYSTEM:  Alert & Oriented X3, anxious, Motor Strength 5/5 B/L upper and lower extremities;  CHEST/LUNG: Clear to percussion bilaterally; No rales, rhonchi, wheezing, or rubs  HEART: Regular rate and rhythm; No murmurs, rubs, or gallops  ABDOMEN: Soft, Nontender, Nondistended; Bowel sounds present  EXTREMITIES:  2+ Peripheral Pulses, No clubbing, cyanosis, or edema  SKIN: No rashes or lesions    LABS:                        12.8   4.92  )-----------( 132      ( 22 Jan 2019 09:19 )             38.4     01-22    139  |  103  |  7   ----------------------------<  126<H>  3.4<L>   |  30  |  0.93    Ca    8.8      22 Jan 2019 09:19    TPro  7.5  /  Alb  2.4<L>  /  TBili  1.4<H>  /  DBili  .90<H>  /  AST  245<H>  /  ALT  155<H>  /  AlkPhos  202<H>  01-22        CAPILLARY BLOOD GLUCOSE                  MEDICATIONS  (STANDING):  lactulose Syrup 15 Gram(s) Oral three times a day  LORazepam     Tablet 1 milliGRAM(s) Oral every 8 hours  methadone    Tablet 45 milliGRAM(s) Oral two times a day  multivitamin 1 Tablet(s) Oral daily  nicotine - 21 mG/24Hr(s) Patch 1 patch Transdermal daily  pantoprazole    Tablet 40 milliGRAM(s) Oral before breakfast  petrolatum white Ointment 1 Application(s) Topical two times a day    MEDICATIONS  (PRN):  aluminum hydroxide/magnesium hydroxide/simethicone Suspension 30 milliLiter(s) Oral every 4 hours PRN Dyspepsia  bisacodyl 5 milliGRAM(s) Oral every 12 hours PRN Constipation  calamine Lotion 1 Application(s) Topical three times a day PRN Itching  calcium carbonate    500 mG (Tums) Chewable 1 Tablet(s) Chew daily PRN Indigestion  LORazepam   Injectable 2 milliGRAM(s) IV Push every 2 hours PRN CIWA-Ar score increase by 2 points and a total score of 7 or less  LORazepam   Injectable 2 milliGRAM(s) IV Push every 1 hour PRN CIWA-Ar score 8 or greater

## 2019-01-22 NOTE — PROGRESS NOTE ADULT - SUBJECTIVE AND OBJECTIVE BOX
INTERVAL HPI/OVERNIGHT EVENTS:  pt seen and examined  denies n/v  c/o mild suprapubic pain  states she is having regular bowel movements  per overnight rn no acute gi issues, ciwa of 7  good po intake per flowsheets  afebrile overnight labs pending    MEDICATIONS  (STANDING):  lactulose Syrup 10 Gram(s) Oral three times a day  LORazepam     Tablet 1 milliGRAM(s) Oral every 8 hours  methadone    Tablet 10 milliGRAM(s) Oral once  methadone    Tablet 10 milliGRAM(s) Oral <User Schedule>  multivitamin 1 Tablet(s) Oral daily  nicotine - 21 mG/24Hr(s) Patch 1 patch Transdermal daily  pantoprazole    Tablet 40 milliGRAM(s) Oral before breakfast  petrolatum white Ointment 1 Application(s) Topical two times a day  piperacillin/tazobactam IVPB. 3.375 Gram(s) IV Intermittent every 8 hours    MEDICATIONS  (PRN):  aluminum hydroxide/magnesium hydroxide/simethicone Suspension 30 milliLiter(s) Oral every 4 hours PRN Dyspepsia  bisacodyl 5 milliGRAM(s) Oral every 12 hours PRN Constipation  calcium carbonate    500 mG (Tums) Chewable 1 Tablet(s) Chew daily PRN Indigestion  LORazepam   Injectable 2 milliGRAM(s) IV Push every 2 hours PRN CIWA-Ar score increase by 2 points and a total score of 7 or less  LORazepam   Injectable 2 milliGRAM(s) IV Push every 1 hour PRN CIWA-Ar score 8 or greater      Allergies    Allergy Status Unknown  No Known Allergies    Intolerances        Review of Systems:    General:  No wt loss, fevers, chills, night sweats, fatigue   Eyes:  Good vision, no reported pain  ENT:  No sore throat, pain, runny nose, dysphagia  CV:  No pain, palpitations, hypo/hypertension  Resp:  No dyspnea, cough, tachypnea, wheezing  GI:  suprapubic pain, No nausea, No vomiting, No diarrhea, No constipation, No weight loss, No fever, No pruritis, No rectal bleeding, No melena, No dysphagia  :  No pain, bleeding, incontinence, nocturia  Muscle:  No pain, weakness  Neuro:  No weakness, tingling, memory problems  Psych:  No fatigue, insomnia, mood problems, depression  Endocrine:  No polyuria, polydypsia, cold/heat intolerance  Heme:  No petechiae, ecchymosis, easy bruisability  Skin:  No rash, tattoos, scars, edema      Vital Signs Last 24 Hrs  T(C): 36.8 (22 Jan 2019 04:54), Max: 36.9 (21 Jan 2019 12:02)  T(F): 98.3 (22 Jan 2019 04:54), Max: 98.4 (21 Jan 2019 12:02)  HR: 83 (22 Jan 2019 04:54) (82 - 83)  BP: 122/77 (22 Jan 2019 04:54) (95/68 - 126/85)  BP(mean): --  RR: 17 (22 Jan 2019 04:54) (17 - 17)  SpO2: 97% (22 Jan 2019 04:54) (92% - 97%)    PHYSICAL EXAM:    Constitutional: NAD  HEENT: ncat  Neck: No LAD  Respiratory: dec bs  Cardiovascular: S1 and S2, RRR  Gastrointestinal: obese soft nt to deep palpation no guarding mild dt  Extremities: No peripheral edema  Vascular: 2+ peripheral pulses  Neurological: Awake alert responds appropriately  Skin: No rashes      LABS:                        12.3   5.59  )-----------( 111      ( 20 Jan 2019 09:55 )             36.8     01-20    140  |  108  |  12  ----------------------------<  96  4.3   |  27  |  0.77    Ca    8.3<L>      20 Jan 2019 08:31  Phos  2.6     01-20  Mg     1.9     01-20            RADIOLOGY & ADDITIONAL TESTS:

## 2019-01-22 NOTE — PROGRESS NOTE ADULT - SUBJECTIVE AND OBJECTIVE BOX
HPI:  The patient is a 39 yo FM with PMH of alcohol abuse, polysubstance abuse, HTN, and Hepatitis C (untreated) who was sent from [facility] for fever and tachycardia. The patient was brought into [facility] by her fiance for detox from alcohol. As per chart the patient has a history of heroin and cocaine use, and has been on methadone maintenance since age 20. Prior to admission to [place], the patient was noted to have a cough. The patient is an unreliable historian, but endorses that she had one episode of red emesis earlier today. Upon admission the ED the patient complained of abd pain, although was sedated on ativan at that time. Upon exam now, she denies abd pain.    In the ED:  T(F): 99.5  HR: 106   BP: 134/66  BP(mean): 89  RR: 16  SpO2: 95%    Labs: WBC 13.24, H-H wnl, plt 144, PTT 13.24, PT 15.4, INR 1.34, Lactate wnl, BUN/Cr wnl, Alb 2.5, Tbili 1.9, Alk Phos 208, , ALT 95, amylase/lipase wnl, bHCG wnl, procal .89, Flu neg, RSV neg.    EKG showing sinus tachy, EKG from [facility] NSR  CXR neg for acute cardiopulm path  CT Ab/pel showing left lower lobe atelectasis and peripheral infiltrates, hiatal hernia, hepatosplenomegaly, nonobstructing L renal calc  Gall U/S with cholelithiasis without cholecystitis    Patient received vanc/zosyn, 2.6 L LR bolus, tylenol, zofran, protonix (18 Jan 2019 23:14)      Detox:  has withdrawal muscle aches   has some tremors  attempting  to confirm dose of Methadone       Allergies    Allergy Status Unknown  No Known Allergies    Intolerances        REVIEW OF SYSTEMS:    Constitutional: No fever, weight loss or fatigue  ENT:  No difficulty hearing, tinnitus, vertigo; No sinus or throat pain  Neck: No pain or stiffness  Respiratory: No cough, wheezing, chills or hemoptysis  Cardiovascular: No chest pain, palpitations, shortness of breath, dizziness or leg swelling  Gastrointestinal: No abdominal or epigastric pain. No nausea, vomiting or hematemesis; No diarrhea or constipation. No melena or hematochezia.  Neurological: No headaches, memory loss, loss of strength, numbness or tremors  Musculoskeletal: No joint pain or swelling; positive muscle, back or extremity pain  Psychiatric: No depression, anxiety, mood swings or difficulty sleeping    MEDICATIONS  (STANDING):  lactulose Syrup 10 Gram(s) Oral three times a day  LORazepam     Tablet 1 milliGRAM(s) Oral every 6 hours  methadone    Tablet 10 milliGRAM(s) Oral <User Schedule>  multivitamin 1 Tablet(s) Oral daily  nicotine - 21 mG/24Hr(s) Patch 1 patch Transdermal daily  pantoprazole    Tablet 40 milliGRAM(s) Oral before breakfast  petrolatum white Ointment 1 Application(s) Topical two times a day  piperacillin/tazobactam IVPB. 3.375 Gram(s) IV Intermittent every 8 hours    MEDICATIONS  (PRN):  aluminum hydroxide/magnesium hydroxide/simethicone Suspension 30 milliLiter(s) Oral every 4 hours PRN Dyspepsia  bisacodyl 5 milliGRAM(s) Oral every 12 hours PRN Constipation  calcium carbonate    500 mG (Tums) Chewable 1 Tablet(s) Chew daily PRN Indigestion  LORazepam   Injectable 2 milliGRAM(s) IV Push every 2 hours PRN CIWA-Ar score increase by 2 points and a total score of 7 or less  LORazepam   Injectable 2 milliGRAM(s) IV Push every 1 hour PRN CIWA-Ar score 8 or greater      Vital Signs Last 24 Hrs  T(C): 36.8 (22 Jan 2019 04:54), Max: 36.9 (21 Jan 2019 12:02)  T(F): 98.3 (22 Jan 2019 04:54), Max: 98.4 (21 Jan 2019 12:02)  HR: 83 (22 Jan 2019 04:54) (82 - 83)  BP: 122/77 (22 Jan 2019 04:54) (95/68 - 126/85)  BP(mean): --  RR: 17 (22 Jan 2019 04:54) (17 - 17)  SpO2: 97% (22 Jan 2019 04:54) (92% - 97%)    PHYSICAL EXAM:    Constitutional: NAD, well-groomed, well-developed  HEENT: PERRLA, EOMI, Normal Hearing, MMM  Neck: No LAD, No JVD  Back: Normal spine flexure, No CVA tenderness  Respiratory: decreased breath sounds  Cardiovascular: S1 and S2, RRR, no M/G/R  Gastrointestinal: BS+, soft, NT/ND  Extremities: No peripheral edema  Vascular: 2+ peripheral pulses  Neurological: A/O x 3, no focal deficits positive tremors    LABS:                        12.3   5.59  )-----------( 111      ( 20 Jan 2019 09:55 )             36.8     01-20    140  |  108  |  12  ----------------------------<  96  4.3   |  27  |  0.77    Ca    8.3<L>      20 Jan 2019 08:31  Phos  2.6     01-20  Mg     1.9     01-20            RADIOLOGY & ADDITIONAL STUDIES:      Assessment and  Plan:       Alcoholism: Taper Ativan    Substance Abuse: Increase dose of Methadone                             Attending will call Clinic

## 2019-01-23 VITALS
TEMPERATURE: 99 F | HEART RATE: 82 BPM | DIASTOLIC BLOOD PRESSURE: 70 MMHG | WEIGHT: 191.8 LBS | SYSTOLIC BLOOD PRESSURE: 118 MMHG | OXYGEN SATURATION: 96 % | RESPIRATION RATE: 17 BRPM

## 2019-01-23 LAB
ALBUMIN SERPL ELPH-MCNC: 2.5 G/DL — LOW (ref 3.3–5)
ALP SERPL-CCNC: 204 U/L — HIGH (ref 40–120)
ALT FLD-CCNC: 144 U/L — HIGH (ref 12–78)
AMMONIA BLD-MCNC: 65 UMOL/L — HIGH (ref 11–32)
ANION GAP SERPL CALC-SCNC: 9 MMOL/L — SIGNIFICANT CHANGE UP (ref 5–17)
AST SERPL-CCNC: 207 U/L — HIGH (ref 15–37)
BASOPHILS # BLD AUTO: 0.06 K/UL — SIGNIFICANT CHANGE UP (ref 0–0.2)
BASOPHILS NFR BLD AUTO: 0.9 % — SIGNIFICANT CHANGE UP (ref 0–2)
BILIRUB SERPL-MCNC: 1.3 MG/DL — HIGH (ref 0.2–1.2)
BUN SERPL-MCNC: 8 MG/DL — SIGNIFICANT CHANGE UP (ref 7–23)
CALCIUM SERPL-MCNC: 9.4 MG/DL — SIGNIFICANT CHANGE UP (ref 8.5–10.1)
CHLORIDE SERPL-SCNC: 105 MMOL/L — SIGNIFICANT CHANGE UP (ref 96–108)
CO2 SERPL-SCNC: 26 MMOL/L — SIGNIFICANT CHANGE UP (ref 22–31)
CREAT SERPL-MCNC: 0.75 MG/DL — SIGNIFICANT CHANGE UP (ref 0.5–1.3)
CULTURE RESULTS: SIGNIFICANT CHANGE UP
CULTURE RESULTS: SIGNIFICANT CHANGE UP
EOSINOPHIL # BLD AUTO: 0.14 K/UL — SIGNIFICANT CHANGE UP (ref 0–0.5)
EOSINOPHIL NFR BLD AUTO: 2.1 % — SIGNIFICANT CHANGE UP (ref 0–6)
GLUCOSE SERPL-MCNC: 77 MG/DL — SIGNIFICANT CHANGE UP (ref 70–99)
HCT VFR BLD CALC: 40.6 % — SIGNIFICANT CHANGE UP (ref 34.5–45)
HGB BLD-MCNC: 13.8 G/DL — SIGNIFICANT CHANGE UP (ref 11.5–15.5)
IMM GRANULOCYTES NFR BLD AUTO: 0.3 % — SIGNIFICANT CHANGE UP (ref 0–1.5)
LYMPHOCYTES # BLD AUTO: 2.38 K/UL — SIGNIFICANT CHANGE UP (ref 1–3.3)
LYMPHOCYTES # BLD AUTO: 35.7 % — SIGNIFICANT CHANGE UP (ref 13–44)
MCHC RBC-ENTMCNC: 34 GM/DL — SIGNIFICANT CHANGE UP (ref 32–36)
MCHC RBC-ENTMCNC: 34 PG — SIGNIFICANT CHANGE UP (ref 27–34)
MCV RBC AUTO: 100 FL — SIGNIFICANT CHANGE UP (ref 80–100)
MONOCYTES # BLD AUTO: 0.62 K/UL — SIGNIFICANT CHANGE UP (ref 0–0.9)
MONOCYTES NFR BLD AUTO: 9.3 % — SIGNIFICANT CHANGE UP (ref 2–14)
NEUTROPHILS # BLD AUTO: 3.44 K/UL — SIGNIFICANT CHANGE UP (ref 1.8–7.4)
NEUTROPHILS NFR BLD AUTO: 51.7 % — SIGNIFICANT CHANGE UP (ref 43–77)
PLATELET # BLD AUTO: 159 K/UL — SIGNIFICANT CHANGE UP (ref 150–400)
POTASSIUM SERPL-MCNC: 3.9 MMOL/L — SIGNIFICANT CHANGE UP (ref 3.5–5.3)
POTASSIUM SERPL-SCNC: 3.9 MMOL/L — SIGNIFICANT CHANGE UP (ref 3.5–5.3)
PROCALCITONIN SERPL-MCNC: 0.64 NG/ML — HIGH (ref 0–0.04)
PROT SERPL-MCNC: 8 G/DL — SIGNIFICANT CHANGE UP (ref 6–8.3)
RBC # BLD: 4.06 M/UL — SIGNIFICANT CHANGE UP (ref 3.8–5.2)
RBC # FLD: 13.2 % — SIGNIFICANT CHANGE UP (ref 10.3–14.5)
SODIUM SERPL-SCNC: 140 MMOL/L — SIGNIFICANT CHANGE UP (ref 135–145)
SPECIMEN SOURCE: SIGNIFICANT CHANGE UP
SPECIMEN SOURCE: SIGNIFICANT CHANGE UP
WBC # BLD: 6.66 K/UL — SIGNIFICANT CHANGE UP (ref 3.8–10.5)
WBC # FLD AUTO: 6.66 K/UL — SIGNIFICANT CHANGE UP (ref 3.8–10.5)

## 2019-01-23 PROCEDURE — 99239 HOSP IP/OBS DSCHRG MGMT >30: CPT

## 2019-01-23 PROCEDURE — 99232 SBSQ HOSP IP/OBS MODERATE 35: CPT

## 2019-01-23 RX ORDER — DOCUSATE SODIUM 100 MG
100 CAPSULE ORAL THREE TIMES A DAY
Qty: 0 | Refills: 0 | Status: DISCONTINUED | OUTPATIENT
Start: 2019-01-23 | End: 2019-01-23

## 2019-01-23 RX ORDER — METHADONE HYDROCHLORIDE 40 MG/1
45 TABLET ORAL ONCE
Qty: 0 | Refills: 0 | Status: DISCONTINUED | OUTPATIENT
Start: 2019-01-23 | End: 2019-01-23

## 2019-01-23 RX ORDER — NICOTINE POLACRILEX 2 MG
0 GUM BUCCAL
Qty: 0 | Refills: 0 | DISCHARGE
Start: 2019-01-23

## 2019-01-23 RX ORDER — METHADONE HYDROCHLORIDE 40 MG/1
0 TABLET ORAL
Qty: 0 | Refills: 0 | COMMUNITY

## 2019-01-23 RX ORDER — SENNA PLUS 8.6 MG/1
2 TABLET ORAL AT BEDTIME
Qty: 0 | Refills: 0 | Status: DISCONTINUED | OUTPATIENT
Start: 2019-01-23 | End: 2019-01-23

## 2019-01-23 RX ORDER — METHADONE HYDROCHLORIDE 40 MG/1
45 TABLET ORAL
Qty: 0 | Refills: 0 | Status: DISCONTINUED | OUTPATIENT
Start: 2019-01-23 | End: 2019-01-23

## 2019-01-23 RX ADMIN — Medication 1 PATCH: at 12:06

## 2019-01-23 RX ADMIN — Medication 1 TABLET(S): at 12:06

## 2019-01-23 RX ADMIN — LACTULOSE 15 GRAM(S): 10 SOLUTION ORAL at 06:04

## 2019-01-23 RX ADMIN — METHADONE HYDROCHLORIDE 45 MILLIGRAM(S): 40 TABLET ORAL at 06:32

## 2019-01-23 RX ADMIN — PANTOPRAZOLE SODIUM 40 MILLIGRAM(S): 20 TABLET, DELAYED RELEASE ORAL at 06:05

## 2019-01-23 RX ADMIN — Medication 1 PATCH: at 11:27

## 2019-01-23 RX ADMIN — Medication 1 MILLIGRAM(S): at 06:04

## 2019-01-23 RX ADMIN — Medication 1 PATCH: at 06:34

## 2019-01-23 RX ADMIN — METHADONE HYDROCHLORIDE 45 MILLIGRAM(S): 40 TABLET ORAL at 10:26

## 2019-01-23 RX ADMIN — Medication 1 APPLICATION(S): at 06:04

## 2019-01-23 NOTE — DISCHARGE NOTE ADULT - CARE PLAN
Principal Discharge DX:	Pneumonia  Goal:	resolution  Assessment and plan of treatment:	-you completed a course of antibiotics and showed improvement  -please follow up with your primary care provider within 1 week of discharge  Secondary Diagnosis:	Drug abuse  Goal:	resolution  Assessment and plan of treatment:	-your methadone dose was verified  -social work met with you and provided resources for ongoign care and treatment  -please refrain from using any addictive substances and maintain sobriety Principal Discharge DX:	Pneumonia  Goal:	resolution  Assessment and plan of treatment:	-you completed a course of antibiotics and showed improvement  -please follow up with your primary care provider within 1 week of discharge  Secondary Diagnosis:	Drug abuse  Goal:	resolution  Assessment and plan of treatment:	-your methadone dose was verified  -social work met with you and provided resources for ongoign care and treatment  -please refrain from using any addictive substances and maintain sobriety  Secondary Diagnosis:	Nicotine dependence  Goal:	resolution  Assessment and plan of treatment:	-smoking cessation discussed approx 5 min, pt states she has patches at home states she will abstain from smoking understands risks vs benefits of smoking cessation

## 2019-01-23 NOTE — PROGRESS NOTE ADULT - ATTENDING COMMENTS
Advanced care planning was discussed with patient and family.  Advanced care planning forms were reviewed and discussed.  Risks, benefits and alternatives of gastroenterologic procedures were discussed in detail and all questions were answered.    30 minutes spent.
Advanced care planning was discussed with patient and family.  Advanced care planning forms were reviewed and discussed.  Risks, benefits and alternatives of gastroenterologic procedures were discussed in detail and all questions were answered.    30 minutes spent.
trend pct, descalate antibx when downtrending, consider ID recs if not improving

## 2019-01-23 NOTE — PROGRESS NOTE ADULT - SUBJECTIVE AND OBJECTIVE BOX
HPI:  The patient is a 37 yo FM with PMH of alcohol abuse, polysubstance abuse, HTN, and Hepatitis C (untreated) who was sent from [facility] for fever and tachycardia. The patient was brought into [facility] by her fiance for detox from alcohol. As per chart the patient has a history of heroin and cocaine use, and has been on methadone maintenance since age 20. Prior to admission to [place], the patient was noted to have a cough. The patient is an unreliable historian, but endorses that she had one episode of red emesis earlier today. Upon admission the ED the patient complained of abd pain, although was sedated on ativan at that time. Upon exam now, she denies abd pain.    In the ED:  T(F): 99.5  HR: 106   BP: 134/66  BP(mean): 89  RR: 16  SpO2: 95%    Labs: WBC 13.24, H-H wnl, plt 144, PTT 13.24, PT 15.4, INR 1.34, Lactate wnl, BUN/Cr wnl, Alb 2.5, Tbili 1.9, Alk Phos 208, , ALT 95, amylase/lipase wnl, bHCG wnl, procal .89, Flu neg, RSV neg.    EKG showing sinus tachy, EKG from [facility] NSR  CXR neg for acute cardiopulm path  CT Ab/pel showing left lower lobe atelectasis and peripheral infiltrates, hiatal hernia, hepatosplenomegaly, nonobstructing L renal calc  Gall U/S with cholelithiasis without cholecystitis    Patient received vanc/zosyn, 2.6 L LR bolus, tylenol, zofran, protonix (18 Jan 2019 23:14)    Detox:  Slight tremor  Confirmed Methadone dose at 90 mg per day  anxious       Allergies    Allergy Status Unknown  No Known Allergies    Intolerances        REVIEW OF SYSTEMS:    Constitutional: No fever, weight loss or fatigue  ENT:  No difficulty hearing, tinnitus, vertigo; No sinus or throat pain  Neck: No pain or stiffness  Respiratory: No cough, wheezing, chills or hemoptysis  Cardiovascular: No chest pain, palpitations, shortness of breath, dizziness or leg swelling  Gastrointestinal: No abdominal or epigastric pain. No nausea, vomiting or hematemesis; No diarrhea or constipation. No melena or hematochezia.  Neurological: No headaches, memory loss, loss of strength, numbness or tremors  Musculoskeletal: No joint pain or swelling; No muscle, back or extremity pain  Psychiatric: No depression, anxiety, mood swings or difficulty sleeping    MEDICATIONS  (STANDING):  docusate sodium 100 milliGRAM(s) Oral three times a day  lactulose Syrup 15 Gram(s) Oral three times a day  LORazepam     Tablet 1 milliGRAM(s) Oral every 8 hours  methadone    Tablet 45 milliGRAM(s) Oral two times a day  multivitamin 1 Tablet(s) Oral daily  nicotine - 21 mG/24Hr(s) Patch 1 patch Transdermal daily  pantoprazole    Tablet 40 milliGRAM(s) Oral before breakfast  petrolatum white Ointment 1 Application(s) Topical two times a day  senna 2 Tablet(s) Oral at bedtime    MEDICATIONS  (PRN):  aluminum hydroxide/magnesium hydroxide/simethicone Suspension 30 milliLiter(s) Oral every 4 hours PRN Dyspepsia  bisacodyl 5 milliGRAM(s) Oral every 12 hours PRN Constipation  calamine Lotion 1 Application(s) Topical three times a day PRN Itching  calcium carbonate    500 mG (Tums) Chewable 1 Tablet(s) Chew daily PRN Indigestion  LORazepam   Injectable 2 milliGRAM(s) IV Push every 2 hours PRN CIWA-Ar score increase by 2 points and a total score of 7 or less  LORazepam   Injectable 2 milliGRAM(s) IV Push every 1 hour PRN CIWA-Ar score 8 or greater      Vital Signs Last 24 Hrs  T(C): 37.1 (23 Jan 2019 04:33), Max: 37.1 (23 Jan 2019 04:33)  T(F): 98.8 (23 Jan 2019 04:33), Max: 98.8 (23 Jan 2019 04:33)  HR: 82 (23 Jan 2019 04:33) (82 - 100)  BP: 118/70 (23 Jan 2019 04:33) (118/70 - 126/79)  BP(mean): --  RR: 17 (23 Jan 2019 04:33) (17 - 18)  SpO2: 96% (23 Jan 2019 04:33) (94% - 96%)    PHYSICAL EXAM:    Constitutional: NAD, well-groomed, well-developed  HEENT: PERRLA, EOMI, Normal Hearing, MMM  Neck: No LAD, No JVD  Back: Normal spine flexure, No CVA tenderness  Respiratory: CTAB/L  Cardiovascular: S1 and S2, RRR, no M/G/R  Gastrointestinal: BS+, soft, NT/ND  Extremities: No peripheral edema  Vascular: 2+ peripheral pulses  Neurological: A/O x 3, no focal deficits slight tremor    LABS:                        13.8   6.66  )-----------( 159      ( 23 Jan 2019 08:05 )             40.6     01-23    140  |  105  |  8   ----------------------------<  77  3.9   |  26  |  0.75    Ca    9.4      23 Jan 2019 08:05    TPro  8.0  /  Alb  2.5<L>  /  TBili  1.3<H>  /  DBili  .70<H>  /  AST  207<H>  /  ALT  144<H>  /  AlkPhos  204<H>  01-23          RADIOLOGY & ADDITIONAL STUDIES:    Assessment and Plan:    Alcoholism: Stop Ativan    Substance abuse: return to Methadone Clinic

## 2019-01-23 NOTE — DISCHARGE NOTE ADULT - PLAN OF CARE
resolution -you completed a course of antibiotics and showed improvement  -please follow up with your primary care provider within 1 week of discharge -your methadone dose was verified  -social work met with you and provided resources for ongoign care and treatment  -please refrain from using any addictive substances and maintain sobriety -smoking cessation discussed approx 5 min, pt states she has patches at home states she will abstain from smoking understands risks vs benefits of smoking cessation

## 2019-01-23 NOTE — DISCHARGE NOTE ADULT - HOSPITAL COURSE
39 yo FM with PMH of alcohol abuse, polysubstance abuse, HTN, and Hepatitis C (untreated) who was sent from [facility] for fever and tachycardia. Admitted for PNA, pt was treated with iv antibx zosyn and showed significant clinical improvement. Pt was maintained on ciwa score and dosed ativan as needed and was weaned off by addiction medicine Dr. Jensen. Pt dose of methadone was also confirmed and restarted. Pt is stable and improved for discharge with close follow up for on-going care and management.

## 2019-01-23 NOTE — PROGRESS NOTE ADULT - SUBJECTIVE AND OBJECTIVE BOX
INTERVAL HPI/OVERNIGHT EVENTS:  pt seen and examined  denies n/v/d/abd pain, reports last bm monday night  reports compliance w lactulose  afebrile overnight labs noted  no acute overnight issues per nursing    MEDICATIONS  (STANDING):  lactulose Syrup 15 Gram(s) Oral three times a day  LORazepam     Tablet 1 milliGRAM(s) Oral every 8 hours  methadone    Tablet 45 milliGRAM(s) Oral two times a day  multivitamin 1 Tablet(s) Oral daily  nicotine - 21 mG/24Hr(s) Patch 1 patch Transdermal daily  pantoprazole    Tablet 40 milliGRAM(s) Oral before breakfast  petrolatum white Ointment 1 Application(s) Topical two times a day    MEDICATIONS  (PRN):  aluminum hydroxide/magnesium hydroxide/simethicone Suspension 30 milliLiter(s) Oral every 4 hours PRN Dyspepsia  bisacodyl 5 milliGRAM(s) Oral every 12 hours PRN Constipation  calamine Lotion 1 Application(s) Topical three times a day PRN Itching  calcium carbonate    500 mG (Tums) Chewable 1 Tablet(s) Chew daily PRN Indigestion  LORazepam   Injectable 2 milliGRAM(s) IV Push every 2 hours PRN CIWA-Ar score increase by 2 points and a total score of 7 or less  LORazepam   Injectable 2 milliGRAM(s) IV Push every 1 hour PRN CIWA-Ar score 8 or greater      Allergies    Allergy Status Unknown  No Known Allergies    Intolerances        Review of Systems:    General:  No wt loss, fevers, chills, night sweats, fatigue   Eyes:  Good vision, no reported pain  ENT:  No sore throat, pain, runny nose, dysphagia  CV:  No pain, palpitations, hypo/hypertension  Resp:  No dyspnea, cough, tachypnea, wheezing  GI:  No pain, No nausea, No vomiting, No diarrhea, No constipation, No weight loss, No fever, No pruritis, No rectal bleeding, No melena, No dysphagia  :  No pain, bleeding, incontinence, nocturia  Muscle:  No pain, weakness  Neuro:  No weakness, tingling, memory problems  Psych:  No fatigue, insomnia, mood problems, depression  Endocrine:  No polyuria, polydypsia, cold/heat intolerance  Heme:  No petechiae, ecchymosis, easy bruisability  Skin:  No rash, tattoos, scars, edema      Vital Signs Last 24 Hrs  T(C): 37.1 (23 Jan 2019 04:33), Max: 37.1 (23 Jan 2019 04:33)  T(F): 98.8 (23 Jan 2019 04:33), Max: 98.8 (23 Jan 2019 04:33)  HR: 82 (23 Jan 2019 04:33) (82 - 100)  BP: 118/70 (23 Jan 2019 04:33) (118/70 - 126/79)  BP(mean): --  RR: 17 (23 Jan 2019 04:33) (17 - 18)  SpO2: 96% (23 Jan 2019 04:33) (94% - 96%)    PHYSICAL EXAM:    Constitutional: NAD  HEENT: ncat  Neck: No LAD  Respiratory: dec bs  Cardiovascular: S1 and S2, RRR  Gastrointestinal: obese soft nt to deep palpation no guarding mild dt  Extremities: No peripheral edema  Vascular: 2+ peripheral pulses  Neurological: Awake alert responds appropriately  Skin: No rashes    LABS:                        13.8   6.66  )-----------( 159      ( 23 Jan 2019 08:05 )             40.6     01-23    140  |  105  |  8   ----------------------------<  77  3.9   |  26  |  0.75    Ca    9.4      23 Jan 2019 08:05    TPro  8.0  /  Alb  2.5<L>  /  TBili  1.3<H>  /  DBili  .70<H>  /  AST  207<H>  /  ALT  144<H>  /  AlkPhos  204<H>  01-23          RADIOLOGY & ADDITIONAL TESTS:

## 2019-01-23 NOTE — PROGRESS NOTE ADULT - REASON FOR ADMISSION
Pneumonia, Detox

## 2019-01-23 NOTE — DISCHARGE NOTE ADULT - MEDICATION SUMMARY - MEDICATIONS TO TAKE
I will START or STAY ON the medications listed below when I get home from the hospital:    methadone  -- 90 milligram(s) by mouth once a day via [facility]  -- Indication: For opioid dependence I will START or STAY ON the medications listed below when I get home from the hospital:    methadone  -- 90 milligram(s) by mouth once a day via [facility] last dose 1/15 at [facility] and last dose [place] 1/23  -- Indication: For opioid dependence    nicotine 21 mg/24 hr transdermal film, extended release  --  by transdermal patch   -- Indication: For smoking cessation

## 2019-01-23 NOTE — DISCHARGE NOTE ADULT - PATIENT PORTAL LINK FT
You can access the [facility] Patient Portal, offered by [facility], by registering with the following website: [email or web address]

## 2019-01-23 NOTE — PROGRESS NOTE ADULT - PROBLEM SELECTOR PLAN 1
CT w hepatosplenomegaly  u/s reviewed, no bilary pathology  lfts overall downtrending  cont ppi, lactulose  trend lfts daily  diet as tolerated  avoid hepatotoxins  chronic hep c; will need outpatient treatment, dw pt  will follow

## 2019-01-23 NOTE — DISCHARGE NOTE ADULT - CARE PROVIDER_API CALL
Zachary Cortez (MD), Medicine  [address] [place] [address]  Phone: [phone]  Fax: [phone]

## 2019-01-23 NOTE — PROGRESS NOTE ADULT - PROBLEM SELECTOR PLAN 2
naseem per primary team  etoh abstinence
-on ativan 2 mg tab q8 at [facility], will continue  -Ativan PRN via CIWA protocol with hold parameters  -folic acid daily, multi vitamin.  -apprec addiction med recs
-on ativan 2 mg tab q8 at [facility], will continue  -Ativan PRN via CIWA protocol  -folic acid daily, multi vitamin.
-on ativan 2 mg tab q8 at [facility], will continue  -Ativan PRN via CIWA protocol  -folic acid daily, multi vitamin.
-on ativan 2 mg tab q8 at [facility], will continue  -Ativan PRN via CIWA protocol  -folic acid daily, multi vitamin.  -apprec addiction med recs

## 2019-01-23 NOTE — DISCHARGE NOTE ADULT - ADDITIONAL INSTRUCTIONS
-please follow up with your primary medical provider and methadone clinic within 1 week and 1 day of discharge respectively  -also please follow up within 1 week of discharge with your gastrolenterologist/hepatologist and gynecologist  -pt responsible to make and attend all follow up appts for further care and management -please follow up with your primary medical provider and methadone clinic within 1 week and 1 day of discharge respectively  -also please follow up within 1 week of discharge with your gastrolenterologist/hepatologist, pscyhiatrist and gynecologist  -pt responsible to make and attend all follow up appts for further care and management

## 2019-04-01 ENCOUNTER — OUTPATIENT (OUTPATIENT)
Dept: OUTPATIENT SERVICES | Facility: HOSPITAL | Age: 39
LOS: 1 days | End: 2019-04-01
Payer: MEDICAID

## 2019-04-01 PROCEDURE — G9001: CPT

## 2019-04-03 DIAGNOSIS — Z71.89 OTHER SPECIFIED COUNSELING: ICD-10-CM

## 2019-04-03 PROBLEM — I10 ESSENTIAL (PRIMARY) HYPERTENSION: Chronic | Status: ACTIVE | Noted: 2019-01-18

## 2019-04-03 PROBLEM — B19.20 UNSPECIFIED VIRAL HEPATITIS C WITHOUT HEPATIC COMA: Chronic | Status: ACTIVE | Noted: 2019-01-18

## 2019-04-03 PROBLEM — F19.10 OTHER PSYCHOACTIVE SUBSTANCE ABUSE, UNCOMPLICATED: Chronic | Status: ACTIVE | Noted: 2019-01-18

## 2019-04-03 PROBLEM — F10.10 ALCOHOL ABUSE, UNCOMPLICATED: Chronic | Status: ACTIVE | Noted: 2019-01-18

## 2019-07-10 PROCEDURE — 74176 CT ABD & PELVIS W/O CONTRAST: CPT

## 2019-07-10 PROCEDURE — 87631 RESP VIRUS 3-5 TARGETS: CPT

## 2019-07-10 PROCEDURE — 71045 X-RAY EXAM CHEST 1 VIEW: CPT

## 2019-07-10 PROCEDURE — 80076 HEPATIC FUNCTION PANEL: CPT

## 2019-07-10 PROCEDURE — 85610 PROTHROMBIN TIME: CPT

## 2019-07-10 PROCEDURE — 93005 ELECTROCARDIOGRAM TRACING: CPT

## 2019-07-10 PROCEDURE — 80048 BASIC METABOLIC PNL TOTAL CA: CPT

## 2019-07-10 PROCEDURE — 99285 EMERGENCY DEPT VISIT HI MDM: CPT | Mod: 25

## 2019-07-10 PROCEDURE — 82248 BILIRUBIN DIRECT: CPT

## 2019-07-10 PROCEDURE — 85027 COMPLETE CBC AUTOMATED: CPT

## 2019-07-10 PROCEDURE — 82150 ASSAY OF AMYLASE: CPT

## 2019-07-10 PROCEDURE — 96365 THER/PROPH/DIAG IV INF INIT: CPT

## 2019-07-10 PROCEDURE — 83605 ASSAY OF LACTIC ACID: CPT

## 2019-07-10 PROCEDURE — 84702 CHORIONIC GONADOTROPIN TEST: CPT

## 2019-07-10 PROCEDURE — 84100 ASSAY OF PHOSPHORUS: CPT

## 2019-07-10 PROCEDURE — 85730 THROMBOPLASTIN TIME PARTIAL: CPT

## 2019-07-10 PROCEDURE — 83735 ASSAY OF MAGNESIUM: CPT

## 2019-07-10 PROCEDURE — 76705 ECHO EXAM OF ABDOMEN: CPT

## 2019-07-10 PROCEDURE — 80053 COMPREHEN METABOLIC PANEL: CPT

## 2019-07-10 PROCEDURE — 82140 ASSAY OF AMMONIA: CPT

## 2019-07-10 PROCEDURE — 84145 PROCALCITONIN (PCT): CPT

## 2019-07-10 PROCEDURE — 87040 BLOOD CULTURE FOR BACTERIA: CPT

## 2019-07-10 PROCEDURE — 83690 ASSAY OF LIPASE: CPT

## 2019-09-30 ENCOUNTER — EMERGENCY (EMERGENCY)
Facility: HOSPITAL | Age: 39
LOS: 1 days | Discharge: HOME | End: 2019-09-30
Attending: EMERGENCY MEDICINE | Admitting: EMERGENCY MEDICINE
Payer: MEDICAID

## 2019-09-30 VITALS
SYSTOLIC BLOOD PRESSURE: 121 MMHG | HEART RATE: 102 BPM | OXYGEN SATURATION: 98 % | DIASTOLIC BLOOD PRESSURE: 73 MMHG | RESPIRATION RATE: 16 BRPM | TEMPERATURE: 98 F

## 2019-09-30 VITALS
SYSTOLIC BLOOD PRESSURE: 125 MMHG | HEART RATE: 104 BPM | OXYGEN SATURATION: 98 % | WEIGHT: 179.9 LBS | HEIGHT: 62 IN | RESPIRATION RATE: 16 BRPM | DIASTOLIC BLOOD PRESSURE: 83 MMHG | TEMPERATURE: 98 F

## 2019-09-30 LAB
ALBUMIN SERPL ELPH-MCNC: 2.9 G/DL — LOW (ref 3.3–5)
ALP SERPL-CCNC: 215 U/L — HIGH (ref 40–120)
ALT FLD-CCNC: 77 U/L — SIGNIFICANT CHANGE UP (ref 12–78)
AMMONIA BLD-MCNC: 55 UMOL/L — HIGH (ref 11–32)
AMPHET UR-MCNC: NEGATIVE — SIGNIFICANT CHANGE UP
ANION GAP SERPL CALC-SCNC: 9 MMOL/L — SIGNIFICANT CHANGE UP (ref 5–17)
AST SERPL-CCNC: 161 U/L — HIGH (ref 15–37)
BARBITURATES UR SCN-MCNC: NEGATIVE — SIGNIFICANT CHANGE UP
BASOPHILS # BLD AUTO: 0.04 K/UL — SIGNIFICANT CHANGE UP (ref 0–0.2)
BASOPHILS NFR BLD AUTO: 0.7 % — SIGNIFICANT CHANGE UP (ref 0–2)
BENZODIAZ UR-MCNC: NEGATIVE — SIGNIFICANT CHANGE UP
BILIRUB SERPL-MCNC: 3.4 MG/DL — HIGH (ref 0.2–1.2)
BUN SERPL-MCNC: 14 MG/DL — SIGNIFICANT CHANGE UP (ref 7–23)
CALCIUM SERPL-MCNC: 9.6 MG/DL — SIGNIFICANT CHANGE UP (ref 8.5–10.1)
CHLORIDE SERPL-SCNC: 103 MMOL/L — SIGNIFICANT CHANGE UP (ref 96–108)
CO2 SERPL-SCNC: 24 MMOL/L — SIGNIFICANT CHANGE UP (ref 22–31)
COCAINE METAB.OTHER UR-MCNC: NEGATIVE — SIGNIFICANT CHANGE UP
CREAT SERPL-MCNC: 0.8 MG/DL — SIGNIFICANT CHANGE UP (ref 0.5–1.3)
EOSINOPHIL # BLD AUTO: 0.09 K/UL — SIGNIFICANT CHANGE UP (ref 0–0.5)
EOSINOPHIL NFR BLD AUTO: 1.7 % — SIGNIFICANT CHANGE UP (ref 0–6)
ETHANOL SERPL-MCNC: <10 MG/DL — SIGNIFICANT CHANGE UP (ref 0–10)
GLUCOSE SERPL-MCNC: 91 MG/DL — SIGNIFICANT CHANGE UP (ref 70–99)
HCT VFR BLD CALC: 38.8 % — SIGNIFICANT CHANGE UP (ref 34.5–45)
HGB BLD-MCNC: 13.4 G/DL — SIGNIFICANT CHANGE UP (ref 11.5–15.5)
IMM GRANULOCYTES NFR BLD AUTO: 0.4 % — SIGNIFICANT CHANGE UP (ref 0–1.5)
LYMPHOCYTES # BLD AUTO: 1.81 K/UL — SIGNIFICANT CHANGE UP (ref 1–3.3)
LYMPHOCYTES # BLD AUTO: 33.3 % — SIGNIFICANT CHANGE UP (ref 13–44)
MCHC RBC-ENTMCNC: 33.6 PG — SIGNIFICANT CHANGE UP (ref 27–34)
MCHC RBC-ENTMCNC: 34.5 GM/DL — SIGNIFICANT CHANGE UP (ref 32–36)
MCV RBC AUTO: 97.2 FL — SIGNIFICANT CHANGE UP (ref 80–100)
METHADONE UR-MCNC: POSITIVE — SIGNIFICANT CHANGE UP
MONOCYTES # BLD AUTO: 0.54 K/UL — SIGNIFICANT CHANGE UP (ref 0–0.9)
MONOCYTES NFR BLD AUTO: 9.9 % — SIGNIFICANT CHANGE UP (ref 2–14)
NEUTROPHILS # BLD AUTO: 2.94 K/UL — SIGNIFICANT CHANGE UP (ref 1.8–7.4)
NEUTROPHILS NFR BLD AUTO: 54 % — SIGNIFICANT CHANGE UP (ref 43–77)
NRBC # BLD: 0 /100 WBCS — SIGNIFICANT CHANGE UP (ref 0–0)
OPIATES UR-MCNC: NEGATIVE — SIGNIFICANT CHANGE UP
PCP SPEC-MCNC: SIGNIFICANT CHANGE UP
PCP UR-MCNC: NEGATIVE — SIGNIFICANT CHANGE UP
PLATELET # BLD AUTO: 79 K/UL — LOW (ref 150–400)
POTASSIUM SERPL-MCNC: 3.3 MMOL/L — LOW (ref 3.5–5.3)
POTASSIUM SERPL-SCNC: 3.3 MMOL/L — LOW (ref 3.5–5.3)
PROT SERPL-MCNC: 8.2 G/DL — SIGNIFICANT CHANGE UP (ref 6–8.3)
RBC # BLD: 3.99 M/UL — SIGNIFICANT CHANGE UP (ref 3.8–5.2)
RBC # FLD: 14.4 % — SIGNIFICANT CHANGE UP (ref 10.3–14.5)
SODIUM SERPL-SCNC: 136 MMOL/L — SIGNIFICANT CHANGE UP (ref 135–145)
THC UR QL: NEGATIVE — SIGNIFICANT CHANGE UP
WBC # BLD: 5.44 K/UL — SIGNIFICANT CHANGE UP (ref 3.8–10.5)
WBC # FLD AUTO: 5.44 K/UL — SIGNIFICANT CHANGE UP (ref 3.8–10.5)

## 2019-09-30 PROCEDURE — 82140 ASSAY OF AMMONIA: CPT

## 2019-09-30 PROCEDURE — 80307 DRUG TEST PRSMV CHEM ANLYZR: CPT

## 2019-09-30 PROCEDURE — 99285 EMERGENCY DEPT VISIT HI MDM: CPT | Mod: 25

## 2019-09-30 PROCEDURE — 99285 EMERGENCY DEPT VISIT HI MDM: CPT

## 2019-09-30 PROCEDURE — 36415 COLL VENOUS BLD VENIPUNCTURE: CPT

## 2019-09-30 PROCEDURE — 80053 COMPREHEN METABOLIC PANEL: CPT

## 2019-09-30 PROCEDURE — 85027 COMPLETE CBC AUTOMATED: CPT

## 2019-09-30 RX ORDER — METHADONE HYDROCHLORIDE 40 MG/1
90 TABLET ORAL
Qty: 0 | Refills: 0 | DISCHARGE

## 2019-09-30 RX ADMIN — Medication 1 MILLIGRAM(S): at 03:40

## 2019-09-30 NOTE — ED ADULT NURSE NOTE - CHPI ED NUR SYMPTOMS NEG
no tingling/no vomiting/no nausea/no dizziness/no fever/no weakness/no pain/no chills/no decreased eating/drinking

## 2019-09-30 NOTE — ED PROVIDER NOTE - PATIENT PORTAL LINK FT
You can access the FollowMyHealth Patient Portal offered by [facility] by registering at the following website: [email or web address]. By joining [facility]’s FollowMyHealth portal, you will also be able to view your health information using other applications (apps) compatible with our system.

## 2019-09-30 NOTE — ED PROVIDER NOTE - PROGRESS NOTE DETAILS
spoke with dr shannon, results reviewed, pt is cleared to go back to [facility], stable for transfer

## 2019-09-30 NOTE — ED ADULT NURSE NOTE - NSHOSCREENINGQ1_ED_ALL_ED
Problem: Leo Care  Goal: Leo has at least two successful feeding interactions  Outcome: Outcome Met, Continue evaluating goal progress toward completion     19 [id number] 19 [id number] 19 0430   OTHER   Feeding Route  --   --  Bottle   Bottle Feeding Type  --   --  Formula   Feeding Information   Formula  --  Similac Total Comfort  --    Formula Volume (mL)  --   --  32 mL   Feeding Information   Swallow Assessment  --   --  Visible   Feeding Tolerance  --   --  Tolerates well   Bedside Human Milk Volume (mL) 1.4 mL  --   --    Feeding Observed/Reported  --   --  feeding reported   Feeding Route   Nipple Type  --   --  Slow kris   Infant Feeding Assessment   Suck Assessment  --   --  Organized;Rhythmic and smooth       Problem: Breastfeeding  Goal: Successful breastfeeding, as evidenced by proper suck/swallow, <10% weight loss, adequate void/stool.  Outcome: Outcome Met, Continue evaluating goal progress toward completion     19 1641   Vitals   Pct Wt Change -3.56 %          No

## 2019-09-30 NOTE — ED ADULT NURSE NOTE - OBJECTIVE STATEMENT
36.7
39 y/o F patient presents to ED from [facility] via EMS c/o tremors. Patient reports she is at [facility] for alcohol withdrawal and this morning she had an argument with a nurse today who noticed her tremors. Patient reports she was at her baseline and was not withdrawing. As per patient, her last drink was 1 beer yesterday. Patient A&Ox4 upon arrival to ED. speech clear and coherent. lungs CTA. Patient denies HA, dizziness, SOB, chest pain, abdominal pain, N/V/D. Safety and comfort measures provided and maintained.

## 2019-09-30 NOTE — ED PROVIDER NOTE - OBJECTIVE STATEMENT
37yo female bib ems from [facility] with period of shaking as per ems. pt is in for alcohol withdrawal from 5 days ago and states she was arguing with someone at the  and she noticed pt was shaking and she thought pt was still withdrawing, pt states this is her baseline and it takes about a month clean and sober to stop shaking, pt has no other compalints

## 2019-09-30 NOTE — ED ADULT TRIAGE NOTE - CHIEF COMPLAINT QUOTE
as per transfer papers, patient sent in for change in mental status- as per ems, patient was shaking, patient was confused for a few minutes, now alert, oriented * 3

## 2019-10-01 ENCOUNTER — OUTPATIENT (OUTPATIENT)
Dept: OUTPATIENT SERVICES | Facility: HOSPITAL | Age: 39
LOS: 1 days | End: 2019-10-01
Payer: MEDICAID

## 2019-10-01 PROCEDURE — G9001: CPT

## 2019-10-10 DIAGNOSIS — Z71.89 OTHER SPECIFIED COUNSELING: ICD-10-CM

## 2019-10-14 NOTE — ED ADULT NURSE NOTE - NSSEPSISSUSPECTED_ED_A_ED
Clinic Administered Medication Documentation    MEDICATION LIST:   Depo Provera Documentation    Prior to injection, verified patient identity using patient's name and date of birth. Medication was administered. Please see MAR and medication order for additional information. Patient instructed to report any adverse reaction to staff immediately .    BP: 110/80    LAST PAP/EXAM:   Lab Results   Component Value Date    PAP NIL 07/09/2019     URINE HCG:negative    NEXT INJECTION DUE: 12/30/19 - 1/13/20    Was entire vial of medication used? Yes  Vial/Syringe: Single dose vial  Expiration Date:  7/2020    Nicholas BOLAND CMA      
No

## 2019-11-01 ENCOUNTER — OUTPATIENT (OUTPATIENT)
Dept: OUTPATIENT SERVICES | Facility: HOSPITAL | Age: 39
LOS: 1 days | End: 2019-11-01

## 2019-11-13 ENCOUNTER — INPATIENT (INPATIENT)
Facility: HOSPITAL | Age: 39
LOS: 0 days | Discharge: HOME | DRG: 433 | End: 2019-11-14
Attending: FAMILY MEDICINE | Admitting: HOSPITALIST
Payer: MEDICAID

## 2019-11-13 VITALS
HEART RATE: 109 BPM | HEIGHT: 62 IN | SYSTOLIC BLOOD PRESSURE: 122 MMHG | TEMPERATURE: 98 F | OXYGEN SATURATION: 98 % | DIASTOLIC BLOOD PRESSURE: 83 MMHG | RESPIRATION RATE: 18 BRPM | WEIGHT: 160.06 LBS

## 2019-11-13 LAB
ALBUMIN SERPL ELPH-MCNC: 2.7 G/DL — LOW (ref 3.3–5.2)
ALP SERPL-CCNC: 137 U/L — HIGH (ref 40–120)
ALT FLD-CCNC: 73 U/L — HIGH
AMMONIA BLD-MCNC: 58 UMOL/L — HIGH (ref 11–55)
AMPHET UR-MCNC: NEGATIVE — SIGNIFICANT CHANGE UP
ANION GAP SERPL CALC-SCNC: 12 MMOL/L — SIGNIFICANT CHANGE UP (ref 5–17)
ANISOCYTOSIS BLD QL: SLIGHT — SIGNIFICANT CHANGE UP
APAP SERPL-MCNC: <7.5 UG/ML — LOW (ref 10–26)
APPEARANCE UR: CLEAR — SIGNIFICANT CHANGE UP
AST SERPL-CCNC: 147 U/L — HIGH
BACTERIA # UR AUTO: NEGATIVE — SIGNIFICANT CHANGE UP
BARBITURATES UR SCN-MCNC: NEGATIVE — SIGNIFICANT CHANGE UP
BASOPHILS # BLD AUTO: 0.06 K/UL — SIGNIFICANT CHANGE UP (ref 0–0.2)
BASOPHILS NFR BLD AUTO: 0.9 % — SIGNIFICANT CHANGE UP (ref 0–2)
BENZODIAZ UR-MCNC: NEGATIVE — SIGNIFICANT CHANGE UP
BILIRUB SERPL-MCNC: 1.9 MG/DL — SIGNIFICANT CHANGE UP (ref 0.4–2)
BILIRUB UR-MCNC: NEGATIVE — SIGNIFICANT CHANGE UP
BUN SERPL-MCNC: 12 MG/DL — SIGNIFICANT CHANGE UP (ref 8–20)
CALCIUM SERPL-MCNC: 8.5 MG/DL — LOW (ref 8.6–10.2)
CHLORIDE SERPL-SCNC: 93 MMOL/L — LOW (ref 98–107)
CO2 SERPL-SCNC: 25 MMOL/L — SIGNIFICANT CHANGE UP (ref 22–29)
COCAINE METAB.OTHER UR-MCNC: NEGATIVE — SIGNIFICANT CHANGE UP
COLOR SPEC: YELLOW — SIGNIFICANT CHANGE UP
CREAT SERPL-MCNC: 0.46 MG/DL — LOW (ref 0.5–1.3)
DIFF PNL FLD: NEGATIVE — SIGNIFICANT CHANGE UP
EOSINOPHIL # BLD AUTO: 0.34 K/UL — SIGNIFICANT CHANGE UP (ref 0–0.5)
EOSINOPHIL NFR BLD AUTO: 5.3 % — SIGNIFICANT CHANGE UP (ref 0–6)
EPI CELLS # UR: ABNORMAL
ETHANOL SERPL-MCNC: <10 MG/DL — SIGNIFICANT CHANGE UP
GIANT PLATELETS BLD QL SMEAR: PRESENT — SIGNIFICANT CHANGE UP
GLUCOSE SERPL-MCNC: 106 MG/DL — SIGNIFICANT CHANGE UP (ref 70–115)
GLUCOSE UR QL: NEGATIVE MG/DL — SIGNIFICANT CHANGE UP
HCT VFR BLD CALC: 33.8 % — LOW (ref 34.5–45)
HGB BLD-MCNC: 11.6 G/DL — SIGNIFICANT CHANGE UP (ref 11.5–15.5)
KETONES UR-MCNC: NEGATIVE — SIGNIFICANT CHANGE UP
LEUKOCYTE ESTERASE UR-ACNC: ABNORMAL
LYMPHOCYTES # BLD AUTO: 1.62 K/UL — SIGNIFICANT CHANGE UP (ref 1–3.3)
LYMPHOCYTES # BLD AUTO: 25.4 % — SIGNIFICANT CHANGE UP (ref 13–44)
MACROCYTES BLD QL: SLIGHT — SIGNIFICANT CHANGE UP
MAGNESIUM SERPL-MCNC: 1.9 MG/DL — SIGNIFICANT CHANGE UP (ref 1.6–2.6)
MANUAL SMEAR VERIFICATION: SIGNIFICANT CHANGE UP
MCHC RBC-ENTMCNC: 33.4 PG — SIGNIFICANT CHANGE UP (ref 27–34)
MCHC RBC-ENTMCNC: 34.3 GM/DL — SIGNIFICANT CHANGE UP (ref 32–36)
MCV RBC AUTO: 97.4 FL — SIGNIFICANT CHANGE UP (ref 80–100)
METAMYELOCYTES # FLD: 0.9 % — HIGH (ref 0–0)
METHADONE UR-MCNC: POSITIVE
MONOCYTES # BLD AUTO: 1.45 K/UL — HIGH (ref 0–0.9)
MONOCYTES NFR BLD AUTO: 22.8 % — HIGH (ref 2–14)
MYELOCYTES NFR BLD: 0.9 % — HIGH (ref 0–0)
NEUTROPHILS # BLD AUTO: 2.62 K/UL — SIGNIFICANT CHANGE UP (ref 1.8–7.4)
NEUTROPHILS NFR BLD AUTO: 41.2 % — LOW (ref 43–77)
NITRITE UR-MCNC: NEGATIVE — SIGNIFICANT CHANGE UP
OPIATES UR-MCNC: NEGATIVE — SIGNIFICANT CHANGE UP
OVALOCYTES BLD QL SMEAR: SLIGHT — SIGNIFICANT CHANGE UP
PCP SPEC-MCNC: SIGNIFICANT CHANGE UP
PCP UR-MCNC: NEGATIVE — SIGNIFICANT CHANGE UP
PH UR: 6 — SIGNIFICANT CHANGE UP (ref 5–8)
PLAT MORPH BLD: NORMAL — SIGNIFICANT CHANGE UP
PLATELET # BLD AUTO: 184 K/UL — SIGNIFICANT CHANGE UP (ref 150–400)
POLYCHROMASIA BLD QL SMEAR: SLIGHT — SIGNIFICANT CHANGE UP
POTASSIUM SERPL-MCNC: 4.5 MMOL/L — SIGNIFICANT CHANGE UP (ref 3.5–5.3)
POTASSIUM SERPL-SCNC: 4.5 MMOL/L — SIGNIFICANT CHANGE UP (ref 3.5–5.3)
PROT SERPL-MCNC: 7.1 G/DL — SIGNIFICANT CHANGE UP (ref 6.6–8.7)
PROT UR-MCNC: NEGATIVE MG/DL — SIGNIFICANT CHANGE UP
RBC # BLD: 3.47 M/UL — LOW (ref 3.8–5.2)
RBC # FLD: 14.1 % — SIGNIFICANT CHANGE UP (ref 10.3–14.5)
RBC BLD AUTO: ABNORMAL
RBC CASTS # UR COMP ASSIST: SIGNIFICANT CHANGE UP /HPF (ref 0–4)
SALICYLATES SERPL-MCNC: <0.6 MG/DL — LOW (ref 10–20)
SMUDGE CELLS # BLD: PRESENT — SIGNIFICANT CHANGE UP
SODIUM SERPL-SCNC: 130 MMOL/L — LOW (ref 135–145)
SP GR SPEC: 1.01 — SIGNIFICANT CHANGE UP (ref 1.01–1.02)
T4 AB SER-ACNC: 8.3 UG/DL — SIGNIFICANT CHANGE UP (ref 4.5–12)
THC UR QL: NEGATIVE — SIGNIFICANT CHANGE UP
TSH SERPL-MCNC: 1.74 UIU/ML — SIGNIFICANT CHANGE UP (ref 0.27–4.2)
UROBILINOGEN FLD QL: 1 MG/DL
VARIANT LYMPHS # BLD: 2.6 % — SIGNIFICANT CHANGE UP (ref 0–6)
WBC # BLD: 6.37 K/UL — SIGNIFICANT CHANGE UP (ref 3.8–10.5)
WBC # FLD AUTO: 6.37 K/UL — SIGNIFICANT CHANGE UP (ref 3.8–10.5)
WBC UR QL: SIGNIFICANT CHANGE UP

## 2019-11-13 PROCEDURE — 99223 1ST HOSP IP/OBS HIGH 75: CPT

## 2019-11-13 PROCEDURE — 70450 CT HEAD/BRAIN W/O DYE: CPT | Mod: 26

## 2019-11-13 RX ORDER — THIAMINE MONONITRATE (VIT B1) 100 MG
100 TABLET ORAL ONCE
Refills: 0 | Status: COMPLETED | OUTPATIENT
Start: 2019-11-13 | End: 2019-11-13

## 2019-11-13 RX ORDER — LACTULOSE 10 G/15ML
30 SOLUTION ORAL ONCE
Refills: 0 | Status: COMPLETED | OUTPATIENT
Start: 2019-11-13 | End: 2019-11-13

## 2019-11-13 RX ORDER — SODIUM CHLORIDE 9 MG/ML
1000 INJECTION INTRAMUSCULAR; INTRAVENOUS; SUBCUTANEOUS ONCE
Refills: 0 | Status: COMPLETED | OUTPATIENT
Start: 2019-11-13 | End: 2019-11-13

## 2019-11-13 RX ORDER — FOLIC ACID 0.8 MG
1 TABLET ORAL ONCE
Refills: 0 | Status: COMPLETED | OUTPATIENT
Start: 2019-11-13 | End: 2019-11-13

## 2019-11-13 RX ADMIN — Medication 100 MILLIGRAM(S): at 15:37

## 2019-11-13 RX ADMIN — SODIUM CHLORIDE 1000 MILLILITER(S): 9 INJECTION INTRAMUSCULAR; INTRAVENOUS; SUBCUTANEOUS at 15:28

## 2019-11-13 RX ADMIN — Medication 50 MILLIGRAM(S): at 21:50

## 2019-11-13 RX ADMIN — Medication 1 MILLIGRAM(S): at 15:36

## 2019-11-13 RX ADMIN — LACTULOSE 30 GRAM(S): 10 SOLUTION ORAL at 21:21

## 2019-11-13 NOTE — H&P ADULT - PROBLEM SELECTOR PLAN 5
likely multifactorial given anxiety, etoh withdrawal and encephalopathy  otherwise no respiratory difficulty, chest pain, dizziness, fever  cxr clear  continue to monitor

## 2019-11-13 NOTE — ED PROVIDER NOTE - CARE PLAN
Principal Discharge DX:	Hepatic encephalopathy  Secondary Diagnosis:	Alcohol withdrawal syndrome, with delirium

## 2019-11-13 NOTE — ED ADULT NURSE NOTE - OBJECTIVE STATEMENT
presents to ED from [facility] for AMS. as per report patient was in [place] for alcohol detox. in ED patient is drowsy and struggling to stay awake during assessment. CT ordered and blood work. wctm and reassess

## 2019-11-13 NOTE — ED ADULT NURSE REASSESSMENT NOTE - NS ED NURSE REASSESS COMMENT FT1
patient more alert at this time. continues to wander throughout ED despite numerous attempts to return to stretcher. steady gait and alert to self however unable to answer orientation questions to time or situation. patient admits that she came from [facility] but is unable to provide any other secondary information. jaxon

## 2019-11-13 NOTE — ED PROVIDER NOTE - NEUROLOGICAL, MLM
Alert and oriented to person and place (states date is 11/25/19 and that the president is Sabino), no focal deficits, no motor or sensory deficits. Mild resting tremor with hands extended, no tongue fasiculations, no dysmetria on finger to nose

## 2019-11-13 NOTE — H&P ADULT - PROBLEM SELECTOR PLAN 2
pt with etoh withdrawal  will continue pt on ativan taper and ciwa protocol  avoid librium given concern for cirrhosis

## 2019-11-13 NOTE — H&P ADULT - HISTORY OF PRESENT ILLNESS
39F hx IVDU, etoh abuse, hep C presents from [facility] for altered mental status and tachycardia. 39F hx polysubstance abuse(IV heroin, cocain), etoh abuse, hep C presents from [facility] for altered mental status and tachycardia. Patient admitted to [facility] 11/5 for etoh abuse after relapsing. She was started on ativan taper as well as methadone 90 mg. According to documents from [facility] patient started becoming increasingly confused about 2 days ago. She was having hallucinations that family members were in the room and intermittently agitated. Ammonia was checked and found to be 131. Patient started on lactulose and neomycin, but continued to be confused and transferred here for further work up. According to documents patient was admitted to [facility] on 9/19 for AMS and discharged after one week. 39F hx polysubstance abuse(IV heroin, cocain), etoh abuse, hep C presents from [facility] for altered mental status and tachycardia. Patient admitted to [facility] 11/5 for etoh abuse after relapsing. She was started on ativan taper as well as methadone 90 mg. According to documents from [facility] patient started becoming increasingly confused about 2 days ago. She was having hallucinations that family members were in the room and intermittently agitated. Ammonia was checked and found to be 131. Patient started on lactulose and neomycin, but continued to be confused and transferred here for further work up. According to documents patient was admitted to [facility] on 9/19 for AMS and discharged after one week.     In ED, pt tachy to 115. Otherwise stable. Patient is poor historian. Admits to 4-6 Lime-a-haylie's daily prior to [facility] admission as well as occasional heroin/cocaine use. She states very stressed about seeing daughter. Also perseverating about getting back home. Appears to have very poor short term memory, remembers very little of events that occured at [facility].  Denies headache, blurry vision, fever, chill, n/v/d, dysuria, abd pain, chest pain, sob or cough.

## 2019-11-13 NOTE — H&P ADULT - NSHPSOCIALHISTORY_GEN_ALL_CORE
smokes 2 PPD, smoking since she was young  daily etoh abuse (4-6 lime a ritas daily), checked into [facility] 8 days ago  occasional cocaine/heroin use as above, on methadone for treatment

## 2019-11-13 NOTE — H&P ADULT - ASSESSMENT
39F hx polysubstance abuse(IV heroin, cocain), etoh abuse, hep C presents from [facility] for altered mental status and tachycardia.

## 2019-11-13 NOTE — ED ADULT TRIAGE NOTE - CHIEF COMPLAINT QUOTE
Pt sent in from [facility] for "Altered Mental Status". Pt c/o RUQ pain when coughing.

## 2019-11-13 NOTE — H&P ADULT - NSHPPHYSICALEXAM_GEN_ALL_CORE
Vital Signs Last 24 Hrs  T(C): 36.9 (14 Nov 2019 02:10), Max: 37.1 (13 Nov 2019 22:40)  T(F): 98.4 (14 Nov 2019 02:10), Max: 98.8 (13 Nov 2019 22:40)  HR: 101 (14 Nov 2019 02:10) (101 - 115)  BP: 145/84 (14 Nov 2019 02:10) (107/69 - 145/84)  BP(mean): --  RR: 18 (14 Nov 2019 02:10) (18 - 18)  SpO2: 98% (14 Nov 2019 02:10) (98% - 98%)    GENERAL: anxious, pacing, but redirectable  HEENT:  Atraumatic, Normocephalic, MMM, no oropharyngeal lesions  EYES: EOMI, PERRLA, conjunctiva and sclera clear  NECK: Supple, No JVD, no throat tenderness.  CHEST/LUNG: Clear to auscultation bilaterally; No wheezes, rales, or rhonchi  HEART: Regular rate and rhythm; No murmurs, rubs, or gallops  ABDOMEN: Soft, Nontender, Nondistended; Bowel sounds present  EXTREMITIES:  2+ Peripheral Pulses, No clubbing, cyanosis, or edema  PSYCH: AAOx3, but very anxious, poor short term memory, doesn't remember receiving methadone or treatment at [facility], occasionally stating she came from home and wants to go back home  NEUROLOGY: moves all extremities spontaneously. no sensory deficits  SKIN: No rashes or lesions

## 2019-11-13 NOTE — ED ADULT NURSE NOTE - INTERVENTIONS DEFINITIONS
Stretcher in lowest position, wheels locked, appropriate side rails in place/Reinforce activity limits and safety measures with patient and family/Monitor gait and stability/Monitor for mental status changes and reorient to person, place, and time

## 2019-11-13 NOTE — H&P ADULT - PROBLEM SELECTOR PLAN 1
concerning for hepatic encephalopathy given elevated ammonia, etoh and hep c history though not formally diagnosed with cirrhosis  also possible complicated by withdrawal.  will check liver US  c/w lactulose 30 TID and will start on rifaximin.   consider psych consult if no improvement

## 2019-11-13 NOTE — ED ADULT NURSE REASSESSMENT NOTE - NS ED NURSE REASSESS COMMENT FT1
patient removed own IV and continues to wander throughout ED despite continued efforts to keep in stretcher. changed into yellow gown for safety. gait remains steady, patient remains confused to time and situation stating that her  is here. CT scan negative. ammonia levels elevated. will medicate and continue to push PO intake

## 2019-11-13 NOTE — ED PROVIDER NOTE - OBJECTIVE STATEMENT
39 year old female with PMH opiate abuse currently on methadone, etoh abuse, hep C presents with AMS. The pt was in day 8 of alcohol detox at [facility]. She was found today to be confused at [facility] and sent for evaluation. Th ept states that she has had poor PO itnake and feels dehydrated, but she denies headache, blurry vision, neck pain, slurred speech, numbness, tingling, weakness, fever, chills

## 2019-11-13 NOTE — H&P ADULT - NSHPLABSRESULTS_GEN_ALL_CORE
11.6   6.37  )-----------( 184      ( 2019 15:27 )             33.8       -13    130<L>  |  93<L>  |  12.0  ----------------------------<  106  4.5   |  25.0  |  0.46<L>    Ca    8.5<L>      2019 15:27  Mg     1.9         TPro  7.1  /  Alb  2.7<L>  /  TBili  1.9  /  DBili  x   /  AST  147<H>  /  ALT  73<H>  /  AlkPhos  137<H>            Urinalysis Basic - ( 2019 23:06 )    Color: Yellow / Appearance: Clear / S.010 / pH: x  Gluc: x / Ketone: Negative  / Bili: Negative / Urobili: 1 mg/dL   Blood: x / Protein: Negative mg/dL / Nitrite: Negative   Leuk Esterase: Trace / RBC: 0-2 /HPF / WBC 3-5   Sq Epi: x / Non Sq Epi: Moderate / Bacteria: Negative    Lactate Trend    CAPILLARY BLOOD GLUCOSE      RADIOLOGY, EKG & ADDITIONAL TESTS: Reviewed.     < from: CT Head No Cont (. @ 16:26) >      IMPRESSION: Noacute intracranial hemorrhage or mass effect.     < end of copied text >

## 2019-11-13 NOTE — ED PROVIDER NOTE - CLINICAL SUMMARY MEDICAL DECISION MAKING FREE TEXT BOX
Pt with AMS liekly due to elevated ammonia as well alcoho detox. Neck supple, afebrile no indicationg for LP at this time, will admit

## 2019-11-14 VITALS
HEART RATE: 107 BPM | SYSTOLIC BLOOD PRESSURE: 126 MMHG | TEMPERATURE: 97 F | RESPIRATION RATE: 18 BRPM | OXYGEN SATURATION: 97 % | DIASTOLIC BLOOD PRESSURE: 72 MMHG

## 2019-11-14 DIAGNOSIS — F10.231 ALCOHOL DEPENDENCE WITH WITHDRAWAL DELIRIUM: ICD-10-CM

## 2019-11-14 DIAGNOSIS — K72.90 HEPATIC FAILURE, UNSPECIFIED WITHOUT COMA: ICD-10-CM

## 2019-11-14 DIAGNOSIS — R00.0 TACHYCARDIA, UNSPECIFIED: ICD-10-CM

## 2019-11-14 DIAGNOSIS — F11.20 OPIOID DEPENDENCE, UNCOMPLICATED: ICD-10-CM

## 2019-11-14 DIAGNOSIS — B19.20 UNSPECIFIED VIRAL HEPATITIS C WITHOUT HEPATIC COMA: ICD-10-CM

## 2019-11-14 DIAGNOSIS — F19.90 OTHER PSYCHOACTIVE SUBSTANCE USE, UNSPECIFIED, UNCOMPLICATED: ICD-10-CM

## 2019-11-14 DIAGNOSIS — G93.40 ENCEPHALOPATHY, UNSPECIFIED: ICD-10-CM

## 2019-11-14 PROCEDURE — 12345: CPT | Mod: NC

## 2019-11-14 PROCEDURE — 99223 1ST HOSP IP/OBS HIGH 75: CPT

## 2019-11-14 PROCEDURE — 76705 ECHO EXAM OF ABDOMEN: CPT | Mod: 26

## 2019-11-14 RX ORDER — THIAMINE MONONITRATE (VIT B1) 100 MG
100 TABLET ORAL DAILY
Refills: 0 | Status: DISCONTINUED | OUTPATIENT
Start: 2019-11-14 | End: 2019-11-14

## 2019-11-14 RX ORDER — NICOTINE POLACRILEX 2 MG
4 GUM BUCCAL EVERY 6 HOURS
Refills: 0 | Status: DISCONTINUED | OUTPATIENT
Start: 2019-11-14 | End: 2019-11-14

## 2019-11-14 RX ORDER — METHADONE HYDROCHLORIDE 40 MG/1
90 TABLET ORAL
Refills: 0 | Status: DISCONTINUED | OUTPATIENT
Start: 2019-11-14 | End: 2019-11-14

## 2019-11-14 RX ORDER — TRAZODONE HCL 50 MG
50 TABLET ORAL AT BEDTIME
Refills: 0 | Status: DISCONTINUED | OUTPATIENT
Start: 2019-11-14 | End: 2019-11-14

## 2019-11-14 RX ORDER — FOLIC ACID 0.8 MG
1 TABLET ORAL DAILY
Refills: 0 | Status: DISCONTINUED | OUTPATIENT
Start: 2019-11-14 | End: 2019-11-14

## 2019-11-14 RX ORDER — NICOTINE POLACRILEX 2 MG
1 GUM BUCCAL DAILY
Refills: 0 | Status: DISCONTINUED | OUTPATIENT
Start: 2019-11-14 | End: 2019-11-14

## 2019-11-14 RX ORDER — LACTULOSE 10 G/15ML
20 SOLUTION ORAL THREE TIMES A DAY
Refills: 0 | Status: DISCONTINUED | OUTPATIENT
Start: 2019-11-14 | End: 2019-11-14

## 2019-11-14 RX ADMIN — LACTULOSE 20 GRAM(S): 10 SOLUTION ORAL at 05:23

## 2019-11-14 RX ADMIN — Medication 1 MILLIGRAM(S): at 09:24

## 2019-11-14 RX ADMIN — Medication 2 MILLIGRAM(S): at 02:32

## 2019-11-14 RX ADMIN — Medication 1 TABLET(S): at 09:24

## 2019-11-14 RX ADMIN — Medication 1 PATCH: at 09:24

## 2019-11-14 RX ADMIN — Medication 2 MILLIGRAM(S): at 07:44

## 2019-11-14 RX ADMIN — Medication 2 MILLIGRAM(S): at 05:23

## 2019-11-14 RX ADMIN — METHADONE HYDROCHLORIDE 90 MILLIGRAM(S): 40 TABLET ORAL at 09:29

## 2019-11-14 RX ADMIN — Medication 2 MILLIGRAM(S): at 06:02

## 2019-11-14 RX ADMIN — Medication 2 MILLIGRAM(S): at 09:23

## 2019-11-14 RX ADMIN — Medication 100 MILLIGRAM(S): at 09:24

## 2019-11-14 NOTE — BEHAVIORAL HEALTH ASSESSMENT NOTE - NSBHCHARTREVIEWVS_PSY_A_CORE FT
Vital Signs Last 24 Hrs  T(C): 36.3 (14 Nov 2019 10:55), Max: 37.1 (13 Nov 2019 22:40)  T(F): 97.4 (14 Nov 2019 10:55), Max: 98.8 (13 Nov 2019 22:40)  HR: 107 (14 Nov 2019 10:55) (78 - 115)  BP: 126/72 (14 Nov 2019 10:55) (107/69 - 145/84)  RR: 18 (14 Nov 2019 10:55) (18 - 18)  SpO2: 97% (14 Nov 2019 10:55) (96% - 98%)

## 2019-11-14 NOTE — BEHAVIORAL HEALTH ASSESSMENT NOTE - NSBHCONSULTMEDOTHER_PSY_A_CORE FT
lorazepam     Tablet 2 milliGRAM(s) Oral every 2 hours PRN Symptom-triggered 2 point increase in CIWA-Ar  lorazepam   Injectable 2 milliGRAM(s) IV Push every 1 hour PRN Symptom-triggered: each CIWA -Ar score 8 or GREATER

## 2019-11-14 NOTE — DISCHARGE NOTE PROVIDER - NSDCMRMEDTOKEN_GEN_ALL_CORE_FT
Ativan 1 mg oral tablet: 1 tab(s) orally every 6 hours, As Needed  Ativan 2 mg oral tablet: 1 tab(s) orally every 8 hours  Desyrel 50 mg oral tablet: 1 tab(s) orally once a day (at bedtime), As Needed  Desyrel 50 mg oral tablet: 1 tab(s) orally once a day (at bedtime), As Needed  folic acid 1 mg oral tablet: 1 tab(s) orally once a day  folic acid 1 mg oral tablet: 1 tab(s) orally once a day  lactulose 10 g/15 mL oral solution: 30 milliliter(s) orally 2 times a day  lactulose 10 g/15 mL oral syrup: 30 milliliter(s) orally 3 times a day  methadone: 90 milligram(s) orally once a day  methadone 10 mg oral tablet: 9 tab(s) orally once a day  Milk of Magnesia: 30 milliliter(s) orally once a day (at bedtime)  Motrin 400 mg oral tablet: 1 tab(s) orally every 4 hours, As Needed  multivites: 60 international unit(s) orally once a day  Mylanta oral suspension: 30 milliliter(s) orally every 4 hours, As Needed  Nicorette 2 mg oral transmucosal lozenge: 1 lozenge oral transmucosal every hour, As Needed  nicorette patch: 1 patch transdermal once a day- 14mg/24hr  Tylenol 325 mg oral tablet: 2 tab(s) orally every 4 hours, As Needed  Vistaril 25 mg oral capsule: 1 cap(s) orally every 8 hours, As Needed  Vitamin B1 100 mg oral tablet: 1 tab(s) orally once a day

## 2019-11-14 NOTE — DISCHARGE NOTE PROVIDER - NSDCCPCAREPLAN_GEN_ALL_CORE_FT
PRINCIPAL DISCHARGE DIAGNOSIS  Diagnosis: Hepatic encephalopathy  Assessment and Plan of Treatment:       SECONDARY DISCHARGE DIAGNOSES  Diagnosis: Alcohol withdrawal syndrome, with delirium  Assessment and Plan of Treatment:

## 2019-11-14 NOTE — BEHAVIORAL HEALTH ASSESSMENT NOTE - NSBHCHARTREVIEWIMAGING_PSY_A_CORE FT
< from: CT Head No Cont (11.13.19 @ 16:26) >  IMPRESSION: No acute intracranial hemorrhage or mass effect.   < end of copied text >

## 2019-11-14 NOTE — BEHAVIORAL HEALTH ASSESSMENT NOTE - AXIS III
IVDU (intravenous drug user)  Hepatitis C  Drug abuse  Hypertension  hepatic encephalopathy- elevated ammonia levels

## 2019-11-14 NOTE — BEHAVIORAL HEALTH ASSESSMENT NOTE - RISK ASSESSMENT
Low Acute Suicide Risk Pt is at low acute suicide risk due to not having any present suicidal ideations or behavior or having any suicide history.

## 2019-11-14 NOTE — BEHAVIORAL HEALTH ASSESSMENT NOTE - NSBHCONSULTMEDS_PSY_A_CORE FT
lorazepam     Tablet 2 milliGRAM(s) Oral every 4 hours  lorazepam     Tablet   Oral   methadone    Tablet 90 milliGRAM(s) Oral <User Schedule>

## 2019-11-14 NOTE — BEHAVIORAL HEALTH ASSESSMENT NOTE - SUICIDE PROTECTIVE FACTORS
Identifies reasons for living/Has future plans/Responsibility to family and others/Supportive social network of family or friends

## 2019-11-14 NOTE — BEHAVIORAL HEALTH ASSESSMENT NOTE - HPI (INCLUDE ILLNESS QUALITY, SEVERITY, DURATION, TIMING, CONTEXT, MODIFYING FACTORS, ASSOCIATED SIGNS AND SYMPTOMS)
Pt is a 38 yo female with PMHx of ETOH abuse presenting from [facility] where she had been for detox, after developing altered mental status. When pt presented today she was cooperative and responsive to questions. Pt was A&Ox3 and understood why she was in the hospital. She hoped to be sent home, and stated that she wanted to follow up for an outpatient program for her alcohol abuse and start to attend AA meetings. Pt states that she has responsibilities to her children and understands that she shouldn't be drinking alcohol anymore. Pt denies any illicit drug use with this current episode, alcoholic seizures/tremors, or visual and auditory hallucinations.    Pt's  Saurabh, corroborated story and explained that pt has been in and out of detox about 10 times in the past year. He plans on contacting the outpatient program that she had used in the past post-discharge from detox to continue pt's treatment and provide support for her once she is discharged.  discussed w Dr Goff presentation.

## 2019-11-14 NOTE — DISCHARGE NOTE PROVIDER - HOSPITAL COURSE
Patient's  - Jayro Steele wants to take her home against medical advice. Explained to him at length regarding leaving AMA. He understands the risks and says it has happened multiple times in past and he is not too concerned.     She was seen by Psych and cleared to sign AMA.     Advised patient and her  to continue her home medications and follow up with PMD and Psych in 2-3 days. If her symptoms worsen come back to the hospital.

## 2019-11-14 NOTE — BEHAVIORAL HEALTH ASSESSMENT NOTE - SUMMARY
Pt presents today cooperative and responsive to questions. Pt arrived due to presenting with AMS from [facility], where pt had voluntarily gone for detox. Ammonia levels were elevated. Pt understands her condition and how it would be beneficial to stay in hospital to get treatment, but pt would like to sign out AMA. Pt has decision-making capacity for her own care.  present and agrees with her plan for discharge home.

## 2019-12-06 DIAGNOSIS — Z71.89 OTHER SPECIFIED COUNSELING: ICD-10-CM

## 2019-12-12 PROCEDURE — 81001 URINALYSIS AUTO W/SCOPE: CPT

## 2019-12-12 PROCEDURE — 70450 CT HEAD/BRAIN W/O DYE: CPT

## 2019-12-12 PROCEDURE — 83735 ASSAY OF MAGNESIUM: CPT

## 2019-12-12 PROCEDURE — 36415 COLL VENOUS BLD VENIPUNCTURE: CPT

## 2019-12-12 PROCEDURE — 96374 THER/PROPH/DIAG INJ IV PUSH: CPT

## 2019-12-12 PROCEDURE — 85027 COMPLETE CBC AUTOMATED: CPT

## 2019-12-12 PROCEDURE — 84443 ASSAY THYROID STIM HORMONE: CPT

## 2019-12-12 PROCEDURE — 84436 ASSAY OF TOTAL THYROXINE: CPT

## 2019-12-12 PROCEDURE — 99285 EMERGENCY DEPT VISIT HI MDM: CPT | Mod: 25

## 2019-12-12 PROCEDURE — 82140 ASSAY OF AMMONIA: CPT

## 2019-12-12 PROCEDURE — 80053 COMPREHEN METABOLIC PANEL: CPT

## 2019-12-12 PROCEDURE — 76705 ECHO EXAM OF ABDOMEN: CPT

## 2019-12-12 PROCEDURE — 80307 DRUG TEST PRSMV CHEM ANLYZR: CPT

## 2022-01-25 ENCOUNTER — EMERGENCY (EMERGENCY)
Facility: HOSPITAL | Age: 42
LOS: 1 days | Discharge: LEFT BEFORE DISCHARGE | End: 2022-01-25
Attending: EMERGENCY MEDICINE
Payer: MEDICAID

## 2022-01-25 VITALS
WEIGHT: 169.98 LBS | OXYGEN SATURATION: 98 % | RESPIRATION RATE: 18 BRPM | HEIGHT: 62 IN | HEART RATE: 76 BPM | SYSTOLIC BLOOD PRESSURE: 126 MMHG | TEMPERATURE: 98 F | DIASTOLIC BLOOD PRESSURE: 66 MMHG

## 2022-01-25 LAB
ALBUMIN SERPL ELPH-MCNC: 2.5 G/DL — LOW (ref 3.3–5.2)
ALP SERPL-CCNC: 247 U/L — HIGH (ref 40–120)
ALT FLD-CCNC: 63 U/L — HIGH
ANION GAP SERPL CALC-SCNC: 8 MMOL/L — SIGNIFICANT CHANGE UP (ref 5–17)
APTT BLD: 39.1 SEC — HIGH (ref 27.5–35.5)
AST SERPL-CCNC: 153 U/L — HIGH
BASOPHILS # BLD AUTO: 0.03 K/UL — SIGNIFICANT CHANGE UP (ref 0–0.2)
BASOPHILS NFR BLD AUTO: 1.1 % — SIGNIFICANT CHANGE UP (ref 0–2)
BILIRUB SERPL-MCNC: 2.8 MG/DL — HIGH (ref 0.4–2)
BUN SERPL-MCNC: 4.3 MG/DL — LOW (ref 8–20)
CALCIUM SERPL-MCNC: 8.4 MG/DL — LOW (ref 8.6–10.2)
CHLORIDE SERPL-SCNC: 105 MMOL/L — SIGNIFICANT CHANGE UP (ref 98–107)
CO2 SERPL-SCNC: 30 MMOL/L — HIGH (ref 22–29)
CREAT SERPL-MCNC: 0.63 MG/DL — SIGNIFICANT CHANGE UP (ref 0.5–1.3)
EOSINOPHIL # BLD AUTO: 0.05 K/UL — SIGNIFICANT CHANGE UP (ref 0–0.5)
EOSINOPHIL NFR BLD AUTO: 1.9 % — SIGNIFICANT CHANGE UP (ref 0–6)
GLUCOSE SERPL-MCNC: 111 MG/DL — HIGH (ref 70–99)
HCG SERPL-ACNC: <4 MIU/ML — SIGNIFICANT CHANGE UP
HCT VFR BLD CALC: 34.5 % — SIGNIFICANT CHANGE UP (ref 34.5–45)
HGB BLD-MCNC: 11.5 G/DL — SIGNIFICANT CHANGE UP (ref 11.5–15.5)
IMM GRANULOCYTES NFR BLD AUTO: 0.4 % — SIGNIFICANT CHANGE UP (ref 0–1.5)
INR BLD: 1.3 RATIO — HIGH (ref 0.88–1.16)
LIDOCAIN IGE QN: 62 U/L — HIGH (ref 22–51)
LYMPHOCYTES # BLD AUTO: 1.1 K/UL — SIGNIFICANT CHANGE UP (ref 1–3.3)
LYMPHOCYTES # BLD AUTO: 40.9 % — SIGNIFICANT CHANGE UP (ref 13–44)
MCHC RBC-ENTMCNC: 31.7 PG — SIGNIFICANT CHANGE UP (ref 27–34)
MCHC RBC-ENTMCNC: 33.3 GM/DL — SIGNIFICANT CHANGE UP (ref 32–36)
MCV RBC AUTO: 95 FL — SIGNIFICANT CHANGE UP (ref 80–100)
MONOCYTES # BLD AUTO: 0.27 K/UL — SIGNIFICANT CHANGE UP (ref 0–0.9)
MONOCYTES NFR BLD AUTO: 10 % — SIGNIFICANT CHANGE UP (ref 2–14)
NEUTROPHILS # BLD AUTO: 1.23 K/UL — LOW (ref 1.8–7.4)
NEUTROPHILS NFR BLD AUTO: 45.7 % — SIGNIFICANT CHANGE UP (ref 43–77)
PLATELET # BLD AUTO: 88 K/UL — LOW (ref 150–400)
POTASSIUM SERPL-MCNC: 3.1 MMOL/L — LOW (ref 3.5–5.3)
POTASSIUM SERPL-SCNC: 3.1 MMOL/L — LOW (ref 3.5–5.3)
PROT SERPL-MCNC: 6.8 G/DL — SIGNIFICANT CHANGE UP (ref 6.6–8.7)
PROTHROM AB SERPL-ACNC: 14.9 SEC — HIGH (ref 10.6–13.6)
RBC # BLD: 3.63 M/UL — LOW (ref 3.8–5.2)
RBC # FLD: 14.6 % — HIGH (ref 10.3–14.5)
SODIUM SERPL-SCNC: 142 MMOL/L — SIGNIFICANT CHANGE UP (ref 135–145)
WBC # BLD: 2.69 K/UL — LOW (ref 3.8–10.5)
WBC # FLD AUTO: 2.69 K/UL — LOW (ref 3.8–10.5)

## 2022-01-25 PROCEDURE — 99285 EMERGENCY DEPT VISIT HI MDM: CPT

## 2022-01-25 RX ORDER — LIDOCAINE 4 G/100G
5 CREAM TOPICAL ONCE
Refills: 0 | Status: COMPLETED | OUTPATIENT
Start: 2022-01-25 | End: 2022-01-25

## 2022-01-25 RX ORDER — MORPHINE SULFATE 50 MG/1
4 CAPSULE, EXTENDED RELEASE ORAL ONCE
Refills: 0 | Status: DISCONTINUED | OUTPATIENT
Start: 2022-01-25 | End: 2022-01-25

## 2022-01-25 RX ORDER — SUCRALFATE 1 G
1 TABLET ORAL ONCE
Refills: 0 | Status: COMPLETED | OUTPATIENT
Start: 2022-01-25 | End: 2022-01-25

## 2022-01-25 RX ORDER — POTASSIUM CHLORIDE 20 MEQ
40 PACKET (EA) ORAL ONCE
Refills: 0 | Status: COMPLETED | OUTPATIENT
Start: 2022-01-25 | End: 2022-01-25

## 2022-01-25 RX ORDER — FAMOTIDINE 10 MG/ML
20 INJECTION INTRAVENOUS ONCE
Refills: 0 | Status: COMPLETED | OUTPATIENT
Start: 2022-01-25 | End: 2022-01-25

## 2022-01-25 RX ADMIN — Medication 40 MILLIEQUIVALENT(S): at 22:58

## 2022-01-25 RX ADMIN — MORPHINE SULFATE 4 MILLIGRAM(S): 50 CAPSULE, EXTENDED RELEASE ORAL at 19:54

## 2022-01-25 RX ADMIN — MORPHINE SULFATE 4 MILLIGRAM(S): 50 CAPSULE, EXTENDED RELEASE ORAL at 21:57

## 2022-01-25 RX ADMIN — LIDOCAINE 5 MILLILITER(S): 4 CREAM TOPICAL at 19:54

## 2022-01-25 RX ADMIN — Medication 1 GRAM(S): at 22:58

## 2022-01-25 RX ADMIN — FAMOTIDINE 20 MILLIGRAM(S): 10 INJECTION INTRAVENOUS at 19:54

## 2022-01-25 RX ADMIN — Medication 30 MILLILITER(S): at 19:54

## 2022-01-25 NOTE — SBIRT NOTE ADULT - NSSBIRTALCPASSREFTXDET_GEN_A_CORE
Provided SBIRT services: Full screen positive. Referral to Treatment Performed. Screening results were reviewed with the patient and patient was provided information about healthy guidelines and potential negative consequences associated with level of risk. Motivation and readiness to reduce or stop use was discussed and goals and activities to make changes were suggested/offered.  Referral for complete assessment and level of care determination at a certified treatment facility was completed by contacting the treatment facility via phone.  Pending medical clearance. Patient requesting detox and rehab placement.

## 2022-01-25 NOTE — ED STATDOCS - PROGRESS NOTE DETAILS
Marci: hx stage 4 liver failure/cirrhosis, active ivda last use last night, etoh abuse including earlier today, hx ascites requiring tap c/o worsening abd pain and feeling of fluid in abd. sent to main for further eval.

## 2022-01-25 NOTE — ED ADULT NURSE NOTE - OBJECTIVE STATEMENT
Pt c/o abdominal pain 8/10 ongoing for 2 yrs. w/N/V/fevers/diarrhea/HA/dark urine w/foul odor.  On methadone program for 2 yrs., relapsed over summer, last used heroin and ETOH this morning.  Hx. of stage IV liver disease, ascites.

## 2022-01-26 VITALS
RESPIRATION RATE: 17 BRPM | DIASTOLIC BLOOD PRESSURE: 86 MMHG | OXYGEN SATURATION: 95 % | SYSTOLIC BLOOD PRESSURE: 134 MMHG | HEART RATE: 72 BPM | TEMPERATURE: 98 F

## 2022-01-26 PROBLEM — B19.20 UNSPECIFIED VIRAL HEPATITIS C WITHOUT HEPATIC COMA: Chronic | Status: ACTIVE | Noted: 2019-11-14

## 2022-01-26 PROBLEM — F10.10 ALCOHOL ABUSE, UNCOMPLICATED: Chronic | Status: ACTIVE | Noted: 2019-11-14

## 2022-01-26 PROBLEM — F19.90 OTHER PSYCHOACTIVE SUBSTANCE USE, UNSPECIFIED, UNCOMPLICATED: Chronic | Status: ACTIVE | Noted: 2019-11-14

## 2022-01-26 LAB
RAPID RVP RESULT: SIGNIFICANT CHANGE UP
SARS-COV-2 RNA SPEC QL NAA+PROBE: SIGNIFICANT CHANGE UP

## 2022-01-26 PROCEDURE — 0225U NFCT DS DNA&RNA 21 SARSCOV2: CPT

## 2022-01-26 PROCEDURE — 99284 EMERGENCY DEPT VISIT MOD MDM: CPT | Mod: 25

## 2022-01-26 PROCEDURE — 74177 CT ABD & PELVIS W/CONTRAST: CPT | Mod: 26,MG

## 2022-01-26 PROCEDURE — G1004: CPT

## 2022-01-26 PROCEDURE — 85730 THROMBOPLASTIN TIME PARTIAL: CPT

## 2022-01-26 PROCEDURE — 96375 TX/PRO/DX INJ NEW DRUG ADDON: CPT

## 2022-01-26 PROCEDURE — 80053 COMPREHEN METABOLIC PANEL: CPT

## 2022-01-26 PROCEDURE — 84702 CHORIONIC GONADOTROPIN TEST: CPT

## 2022-01-26 PROCEDURE — 36415 COLL VENOUS BLD VENIPUNCTURE: CPT

## 2022-01-26 PROCEDURE — 83690 ASSAY OF LIPASE: CPT

## 2022-01-26 PROCEDURE — 85610 PROTHROMBIN TIME: CPT

## 2022-01-26 PROCEDURE — 96374 THER/PROPH/DIAG INJ IV PUSH: CPT | Mod: XU

## 2022-01-26 PROCEDURE — 85025 COMPLETE CBC W/AUTO DIFF WBC: CPT

## 2022-01-26 PROCEDURE — 74177 CT ABD & PELVIS W/CONTRAST: CPT | Mod: MG

## 2022-01-26 RX ORDER — ONDANSETRON 8 MG/1
4 TABLET, FILM COATED ORAL ONCE
Refills: 0 | Status: COMPLETED | OUTPATIENT
Start: 2022-01-26 | End: 2022-01-26

## 2022-01-26 RX ADMIN — Medication 1 MILLIGRAM(S): at 12:46

## 2022-01-26 RX ADMIN — ONDANSETRON 4 MILLIGRAM(S): 8 TABLET, FILM COATED ORAL at 06:53

## 2022-01-26 RX ADMIN — Medication 1 MILLIGRAM(S): at 06:53

## 2022-01-26 NOTE — ED PROVIDER NOTE - OBJECTIVE STATEMENT
41 year old female with PMH liver cirrhosis presents with abd distension and dull pain, consistent wih her ascites. No fevers, chills, vomiting, diarrhea, melena, chest pain.

## 2022-01-26 NOTE — ED PROVIDER NOTE - NSICDXFAMILYHX_GEN_ALL_CORE_FT
FAMILY HISTORY:  FH: lung cancer, mom    Father  Still living? Unknown  FHx: alcoholism, Age at diagnosis: Age Unknown

## 2022-01-26 NOTE — ED ADULT NURSE REASSESSMENT NOTE - NS ED NURSE REASSESS COMMENT FT1
Pt AOx4, medicated as ordered for anxiety. Pt stated she needed to leave because her ride is here and he is not going to wait for her. Informed pt that she was discharged yet and would be leaving AMA, tried to draw labs, pt said no, she stated she is ok and need to get the one spot that was available at [facility]. MD notified, IV removed, pt left AMA.
Pt received in bed AOx2, c/o pain to right arm, medicated with tylenol 650mg PO. Tray set up for breakfast, assisted to bedpan. Will continue to monitor.

## 2022-01-26 NOTE — ED PROVIDER NOTE - NSICDXPASTMEDICALHX_GEN_ALL_CORE_FT
PAST MEDICAL HISTORY:  Alcohol abuse     Cirrhosis     Drug abuse     ETOH abuse     Hepatitis C     Hepatitis C     Hypertension     IVDU (intravenous drug user)

## 2022-01-26 NOTE — ED PROVIDER NOTE - PROGRESS NOTE DETAILS
Patient continues rest comfortably. 2nd US guided IV placed. As per sign-out from Dr. Schroeder, Patient pending ct abd/pelvis. MD Ty:  received signout regarding patient, pending SBIRT eval. will dc with h/o f/up and gi f/up if discharged from ED to detox facility MD Ty: patient refused blood work and left prior to discharge by physician. MD Ty:  SBIRT called and will see patient MD Ty: SBIRT requesting alcohol level and covid swab

## 2022-02-03 ENCOUNTER — INPATIENT (INPATIENT)
Facility: HOSPITAL | Age: 42
LOS: 2 days | Discharge: LEFT BEFORE DISCHARGE | DRG: 433 | End: 2022-02-06
Attending: INTERNAL MEDICINE | Admitting: STUDENT IN AN ORGANIZED HEALTH CARE EDUCATION/TRAINING PROGRAM
Payer: MEDICAID

## 2022-02-03 VITALS
WEIGHT: 162.04 LBS | SYSTOLIC BLOOD PRESSURE: 164 MMHG | TEMPERATURE: 98 F | HEIGHT: 62 IN | OXYGEN SATURATION: 95 % | RESPIRATION RATE: 18 BRPM | DIASTOLIC BLOOD PRESSURE: 91 MMHG | HEART RATE: 77 BPM

## 2022-02-03 DIAGNOSIS — R79.89 OTHER SPECIFIED ABNORMAL FINDINGS OF BLOOD CHEMISTRY: ICD-10-CM

## 2022-02-03 LAB
ALBUMIN SERPL ELPH-MCNC: 2.3 G/DL — LOW (ref 3.3–5)
ALP SERPL-CCNC: 221 U/L — HIGH (ref 40–120)
ALT FLD-CCNC: 67 U/L — SIGNIFICANT CHANGE UP (ref 12–78)
AMPHET UR-MCNC: NEGATIVE — SIGNIFICANT CHANGE UP
ANION GAP SERPL CALC-SCNC: 6 MMOL/L — SIGNIFICANT CHANGE UP (ref 5–17)
APPEARANCE UR: CLEAR — SIGNIFICANT CHANGE UP
APTT BLD: 43.3 SEC — HIGH (ref 27.5–35.5)
AST SERPL-CCNC: 189 U/L — HIGH (ref 15–37)
BARBITURATES UR SCN-MCNC: NEGATIVE — SIGNIFICANT CHANGE UP
BASOPHILS # BLD AUTO: 0.02 K/UL — SIGNIFICANT CHANGE UP (ref 0–0.2)
BASOPHILS NFR BLD AUTO: 0.6 % — SIGNIFICANT CHANGE UP (ref 0–2)
BENZODIAZ UR-MCNC: NEGATIVE — SIGNIFICANT CHANGE UP
BILIRUB SERPL-MCNC: 2.2 MG/DL — HIGH (ref 0.2–1.2)
BILIRUB UR-MCNC: NEGATIVE — SIGNIFICANT CHANGE UP
BUN SERPL-MCNC: 4 MG/DL — LOW (ref 7–23)
CALCIUM SERPL-MCNC: 7.9 MG/DL — LOW (ref 8.5–10.1)
CHLORIDE SERPL-SCNC: 112 MMOL/L — HIGH (ref 96–108)
CO2 SERPL-SCNC: 27 MMOL/L — SIGNIFICANT CHANGE UP (ref 22–31)
COCAINE METAB.OTHER UR-MCNC: NEGATIVE — SIGNIFICANT CHANGE UP
COLOR SPEC: YELLOW — SIGNIFICANT CHANGE UP
CREAT SERPL-MCNC: 0.56 MG/DL — SIGNIFICANT CHANGE UP (ref 0.5–1.3)
DIFF PNL FLD: NEGATIVE — SIGNIFICANT CHANGE UP
EOSINOPHIL # BLD AUTO: 0.03 K/UL — SIGNIFICANT CHANGE UP (ref 0–0.5)
EOSINOPHIL NFR BLD AUTO: 1 % — SIGNIFICANT CHANGE UP (ref 0–6)
ETHANOL SERPL-MCNC: 279 MG/DL — HIGH (ref 0–10)
GLUCOSE SERPL-MCNC: 131 MG/DL — HIGH (ref 70–99)
GLUCOSE UR QL: NEGATIVE — SIGNIFICANT CHANGE UP
HCG SERPL-ACNC: 1 MIU/ML — SIGNIFICANT CHANGE UP
HCT VFR BLD CALC: 35.2 % — SIGNIFICANT CHANGE UP (ref 34.5–45)
HGB BLD-MCNC: 11.8 G/DL — SIGNIFICANT CHANGE UP (ref 11.5–15.5)
IMM GRANULOCYTES NFR BLD AUTO: 0.3 % — SIGNIFICANT CHANGE UP (ref 0–1.5)
INR BLD: 1.31 RATIO — HIGH (ref 0.88–1.16)
KETONES UR-MCNC: NEGATIVE — SIGNIFICANT CHANGE UP
LEUKOCYTE ESTERASE UR-ACNC: NEGATIVE — SIGNIFICANT CHANGE UP
LIDOCAIN IGE QN: 179 U/L — SIGNIFICANT CHANGE UP (ref 73–393)
LYMPHOCYTES # BLD AUTO: 1.49 K/UL — SIGNIFICANT CHANGE UP (ref 1–3.3)
LYMPHOCYTES # BLD AUTO: 47.3 % — HIGH (ref 13–44)
MAGNESIUM SERPL-MCNC: 1.6 MG/DL — SIGNIFICANT CHANGE UP (ref 1.6–2.6)
MCHC RBC-ENTMCNC: 31.6 PG — SIGNIFICANT CHANGE UP (ref 27–34)
MCHC RBC-ENTMCNC: 33.5 GM/DL — SIGNIFICANT CHANGE UP (ref 32–36)
MCV RBC AUTO: 94.4 FL — SIGNIFICANT CHANGE UP (ref 80–100)
METHADONE UR-MCNC: NEGATIVE — SIGNIFICANT CHANGE UP
MONOCYTES # BLD AUTO: 0.29 K/UL — SIGNIFICANT CHANGE UP (ref 0–0.9)
MONOCYTES NFR BLD AUTO: 9.2 % — SIGNIFICANT CHANGE UP (ref 2–14)
NEUTROPHILS # BLD AUTO: 1.31 K/UL — LOW (ref 1.8–7.4)
NEUTROPHILS NFR BLD AUTO: 41.6 % — LOW (ref 43–77)
NITRITE UR-MCNC: NEGATIVE — SIGNIFICANT CHANGE UP
NRBC # BLD: 0 /100 WBCS — SIGNIFICANT CHANGE UP (ref 0–0)
OPIATES UR-MCNC: NEGATIVE — SIGNIFICANT CHANGE UP
PCP SPEC-MCNC: SIGNIFICANT CHANGE UP
PCP UR-MCNC: NEGATIVE — SIGNIFICANT CHANGE UP
PH UR: 8 — SIGNIFICANT CHANGE UP (ref 5–8)
PHOSPHATE SERPL-MCNC: 2.6 MG/DL — SIGNIFICANT CHANGE UP (ref 2.5–4.5)
PLATELET # BLD AUTO: 83 K/UL — LOW (ref 150–400)
POTASSIUM SERPL-MCNC: 3.4 MMOL/L — LOW (ref 3.5–5.3)
POTASSIUM SERPL-SCNC: 3.4 MMOL/L — LOW (ref 3.5–5.3)
PROT SERPL-MCNC: 6.9 G/DL — SIGNIFICANT CHANGE UP (ref 6–8.3)
PROT UR-MCNC: NEGATIVE — SIGNIFICANT CHANGE UP
PROTHROM AB SERPL-ACNC: 15.1 SEC — HIGH (ref 10.6–13.6)
RBC # BLD: 3.73 M/UL — LOW (ref 3.8–5.2)
RBC # FLD: 14.6 % — HIGH (ref 10.3–14.5)
SARS-COV-2 RNA SPEC QL NAA+PROBE: SIGNIFICANT CHANGE UP
SODIUM SERPL-SCNC: 145 MMOL/L — SIGNIFICANT CHANGE UP (ref 135–145)
SP GR SPEC: 1.01 — SIGNIFICANT CHANGE UP (ref 1.01–1.02)
THC UR QL: NEGATIVE — SIGNIFICANT CHANGE UP
UROBILINOGEN FLD QL: 4
WBC # BLD: 3.15 K/UL — LOW (ref 3.8–10.5)
WBC # FLD AUTO: 3.15 K/UL — LOW (ref 3.8–10.5)

## 2022-02-03 PROCEDURE — 74177 CT ABD & PELVIS W/CONTRAST: CPT | Mod: 26,MA

## 2022-02-03 PROCEDURE — 99285 EMERGENCY DEPT VISIT HI MDM: CPT

## 2022-02-03 PROCEDURE — 99223 1ST HOSP IP/OBS HIGH 75: CPT | Mod: GC

## 2022-02-03 PROCEDURE — 71260 CT THORAX DX C+: CPT | Mod: 26,MA

## 2022-02-03 PROCEDURE — 71045 X-RAY EXAM CHEST 1 VIEW: CPT | Mod: 26

## 2022-02-03 PROCEDURE — 76705 ECHO EXAM OF ABDOMEN: CPT | Mod: 26,RT

## 2022-02-03 RX ORDER — ONDANSETRON 8 MG/1
4 TABLET, FILM COATED ORAL ONCE
Refills: 0 | Status: COMPLETED | OUTPATIENT
Start: 2022-02-03 | End: 2022-02-03

## 2022-02-03 RX ORDER — SODIUM CHLORIDE 9 MG/ML
1000 INJECTION INTRAMUSCULAR; INTRAVENOUS; SUBCUTANEOUS ONCE
Refills: 0 | Status: COMPLETED | OUTPATIENT
Start: 2022-02-03 | End: 2022-02-03

## 2022-02-03 RX ORDER — PANTOPRAZOLE SODIUM 20 MG/1
40 TABLET, DELAYED RELEASE ORAL ONCE
Refills: 0 | Status: COMPLETED | OUTPATIENT
Start: 2022-02-03 | End: 2022-02-03

## 2022-02-03 RX ADMIN — PANTOPRAZOLE SODIUM 40 MILLIGRAM(S): 20 TABLET, DELAYED RELEASE ORAL at 17:49

## 2022-02-03 RX ADMIN — Medication 2 MILLIGRAM(S): at 17:48

## 2022-02-03 RX ADMIN — ONDANSETRON 4 MILLIGRAM(S): 8 TABLET, FILM COATED ORAL at 17:48

## 2022-02-03 RX ADMIN — SODIUM CHLORIDE 1000 MILLILITER(S): 9 INJECTION INTRAMUSCULAR; INTRAVENOUS; SUBCUTANEOUS at 20:24

## 2022-02-03 RX ADMIN — SODIUM CHLORIDE 1000 MILLILITER(S): 9 INJECTION INTRAMUSCULAR; INTRAVENOUS; SUBCUTANEOUS at 17:48

## 2022-02-03 NOTE — H&P ADULT - PROBLEM SELECTOR PLAN 2
- Admits to consuming 8-10 32oz malt liquor/day  - Current smoker, 1-1.5ppd for 27 years  - History of heroin use - was on methadone ,relapsed and states uses 2 bags at a time q3hrs  - CIWA protocol in place  - Started on folic acid, thiamine and multivitamin   - Ordered nicotine dermal patch and nicotine Lozenge   - Patient requesting for outpatient or inpatient detox, d/w social work Polysubstance use - EtOH use disorder and substance abuse disorder (w/ opioids)  Alcohol dependence with withdrawal - mild tremor on admission (s/p ativan by ER)  - Admits to consuming 8-10 32oz malt liquor/day  - Current smoker, 1-1.5ppd for 27 years  - History of heroin use - was on methadone, relapsed and states uses 2 bags at a time q3hrs  - She would like to be placed back on methadone/be referred to the clinic after detox  - CIWA protocol in place with symptom triggered ativan and ATC ativan for now  - Started on folic acid, thiamine and multivitamin   - NRT with nicotine patch and gum PRN  - Patient requesting for outpatient or inpatient detox - consult social work  - Addiction Medicine Dr. Miller

## 2022-02-03 NOTE — H&P ADULT - ATTENDING COMMENTS
41F with PMHx of EtOH use disorder (now relapsed), cirrhosis, IVDU (heroin), HTN p/w abdominal pain admitted for alcohol detoxification and hepatic cirrhosis with reported hematemesis and jaundice. Admit to medicine. CLD for now. GI consult. Placed on CIWA protocol with PRN and ATC ativan - consult addiction medicine to titrate medications. Patient also reports heroin abuse and requesting referral for methadone treatment clinic. She seems amenable to going to rehab or utilizing outpatient resources if she is able to. Discussed with patient at bedside.    Agree with H&P as outlined above, edited where appropriate.

## 2022-02-03 NOTE — H&P ADULT - PROBLEM SELECTOR PLAN 1
- Patient with diffuse abdominal tenderness, ascites and vomiting x 3-4 months   - Admit to [facility]  - CT A/P: Cirrhosis, splenomegaly, and small intra-abdominal ascites. Colonic bowel wall thickening extending from the cecum through the sigmoid colon, which may be related to portal colopathy versus colitis, as seen on prior examination of 1/26/2022.  - US abdomen: Cirrhotic morphology of the liver. Small amount of ascites. Mildly prominent CBD.  - S/p NS x 1L, Zofran 4mg x2, and Protonix 40mg x1 in the ED  - Can consider SBP prophylaxis with Ceftriaxone if signs of hemodynamic instability   - Started on clear liquid diet   - GI Dr. Dunne consulted, f/u reccs - Patient with diffuse abdominal tenderness, ascites and vomiting x 3-4 months, now with worsening jaundice and reported hematemesis  - Admit to [facility]  - CT A/P: Cirrhosis, splenomegaly, and small intra-abdominal ascites. Colonic bowel wall thickening extending from the cecum through the sigmoid colon, which may be related to portal colopathy versus colitis, as seen on prior examination of 1/26/2022.  - US abdomen: Cirrhotic morphology of the liver. Small amount of ascites. Mildly prominent CBD.  - S/p NS x 1L, Zofran 4mg x2, and Protonix 40mg x1 in the ED  - Can consider SBP prophylaxis with Ceftriaxone if signs of hemodynamic instability   - Started on clear liquid diet  - GI Dr. Dunne consulted, f/u reccs

## 2022-02-03 NOTE — H&P ADULT - HISTORY OF PRESENT ILLNESS
41F with PMHx of EtOH use disorder (now relapsed), cirrhosis, IVDU (heroin), HTN 41F with PMHx of EtOH use disorder (now relapsed), cirrhosis, IVDU (heroin), HTN presented to the ED with abdominal pain which comes and goes & has been present for the past month. She also states that her entire body is swelling (hands, legs, abdomen) over the last month. She has had progressive symptoms and came in as she was worsening and getting jaundiced. She quit drinking for years and relapsed a few months ago, drinking multiple 32oz malt liquor beverages a day. She also relapsed with heroin as well. Using 2+bags and said she needs it about every three hours. She would like to get referred to methadone program - she was in this in the past, since she was 20. She would also like to detox from alcohol - either inpatient or with outpatient resources. States that her last drink was 3AM on 2/3/22. Has history of severe withdrawal. She is currently not on any medications. 41F with PMHx of EtOH use disorder (now relapsed), cirrhosis, IVDU (heroin), HTN presented to the ED with abdominal pain which comes and goes & has been present for the past month. She also states that her entire body is swelling (hands, legs, abdomen) over the last month. She has had progressive symptoms and came in as she was worsening and getting jaundiced. She quit drinking for years and relapsed a few months ago, drinking multiple 32oz malt liquor beverages a day. She also relapsed with heroin as well. Using 2+bags and said she needs it about every three hours. She would like to get referred to methadone program - she was in this in the past, since she was 20. She would also like to detox from alcohol - either inpatient or with outpatient resources. States that her last drink was 3AM on 2/3/22. Has history of severe withdrawal. She is currently not on any medications.    ED Course:   Vitals: T 97.5, HR 77, /91, RR 18, SPO2 95% on RA  Labs: WBC 3.15, RBC 3.73, plt 83, pt 15.1, INR 1.31, PTT 43.3, K 3.4, Ca 7.9, UTOx (-), Blood alcohol 279, albumin 2.3, Tbili 2.2, alp 221, ast 189  EKG:  Imaging  US LE:  CT chest:  CT A/P:  US abdomen:  Chest x-ray:  Given in the ED: NS 1L x2, Zofran 4mg x2, Protonix 40mg x1  41F with PMHx of EtOH use disorder (now relapsed), cirrhosis, IVDU (heroin), HTN presented to the ED with abdominal pain which comes and goes & has been present for the past month. She also states that her entire body is swelling (hands, legs, abdomen) over the last month. She has had progressive symptoms and came in as she was worsening and getting jaundiced. She quit drinking for years and relapsed a few months ago, drinking 8-10 32oz malt liquor beverages a day. She also relapsed with heroin as well. Using 2+bags and said she needs it about every three hours. She would like to get referred to methadone program - she was in this in the past, since she was 20. She would also like to detox from alcohol - either inpatient or with outpatient resources. States that her last drink was 3AM on 2/3/22. Has history of severe withdrawal. She also states she has been vomiting scant amount of blood since past few months. She is currently not on any medications.    ED Course:   Vitals: T 97.5, HR 77, /91, RR 18, SPO2 95% on RA  Labs: WBC 3.15, RBC 3.73, plt 83, pt 15.1, INR 1.31, PTT 43.3, K 3.4, Ca 7.9, UTOx (-), Blood alcohol 279, albumin 2.3, Tbili 2.2, alp 221, ast 189  EKG: ordered   Imaging  US LE: ordered   CT chest: No acute intrathoracic pathology on CT.   CT A/P: Cirrhosis, splenomegaly, and small intra-abdominal ascites. Colonic bowel wall thickening extending from the cecum through the sigmoid colon, which may be related to portal colopathy versus colitis, as seen on prior examination of 1/26/2022.  US abdomen: Cirrhotic morphology of the liver. Small amount of ascites. Mildly prominent CBD.  Chest x-ray: wet read- no acute infiltrates  Given in the ED: NS 1L x2, Zofran 4mg x2, Protonix 40mg x1  41F with PMHx of EtOH use disorder (now relapsed), cirrhosis, IVDU (heroin), HTN presented to the ED with abdominal pain which comes and goes & has been present for the past month. She also states that her entire body is swelling (hands, legs, abdomen) over the last month. She has had progressive symptoms and came in as she was worsening and getting jaundiced. She quit drinking for years and relapsed a few months ago, drinking 8-10 32oz malt liquor beverages a day. She also relapsed with heroin as well. Using 2+bags and said she needs it about every three hours. She would like to get referred to methadone program - she was in this in the past, since she was 20. She would also like to detox from alcohol - either inpatient or with outpatient resources. States that her last drink was 3AM on 2/3/22. Has history of severe withdrawal. She also states she has been vomiting scant amount of blood since past few months. She is currently not on any medications.    ED Course:   Vitals: T 97.5, HR 77, /91, RR 18, SPO2 95% on RA  Labs: WBC 3.15, RBC 3.73, plt 83, pt 15.1, INR 1.31, PTT 43.3, K 3.4, Ca 7.9, UTOx (-), Blood alcohol 279, albumin 2.3, Tbili 2.2, alp 221, ast 189  EKG: Normal Sinus Rhythm  US LE: ordered & pending  CT chest: No acute intrathoracic pathology on CT.   CT A/P: Cirrhosis, splenomegaly, and small intra-abdominal ascites. Colonic bowel wall thickening extending from the cecum through the sigmoid colon, which may be related to portal colopathy versus colitis, as seen on prior examination of 1/26/2022.  US abdomen: Cirrhotic morphology of the liver. Small amount of ascites. Mildly prominent CBD.  Chest x-ray: wet read- no acute infiltrates  Given in the ED: NS 1L x2, Zofran 4mg x2, Protonix 40mg x1

## 2022-02-03 NOTE — H&P ADULT - NSICDXFAMILYHX_GEN_ALL_CORE_FT
FAMILY HISTORY:  FH: lung cancer, mom     FAMILY HISTORY:  FH: lung cancer, mom    Father  Still living? Unknown  FHx: alcoholism, Age at diagnosis: Age Unknown

## 2022-02-03 NOTE — ED PROVIDER NOTE - OBJECTIVE STATEMENT
41 female presents to ER requesting detox from alcohol and heroin, last use 3am today, states she had relapsed over the summer, states for the past month she has had swelling of abdomen, hands and lower extremities, abdominal pain, nausea and vomiting, states she 41 female presents to ER requesting detox from alcohol and heroin, last use 3am today, states she had relapsed over the summer, states for the past month she has had swelling of abdomen, hands and lower extremities, abdominal pain, nausea and vomiting, states she has vomited blood and has been having intermitant chills and fever.

## 2022-02-03 NOTE — H&P ADULT - PROBLEM SELECTOR PLAN 3
Plt on admission 83, likely in setting of hepatic cirrhosis   - monitor for signs of acute bleeding   - f/u AM CBC Plt on admission 83, likely in setting of hepatic cirrhosis   - monitor for signs of acute bleeding - pt reporting hematemesis  - hold chemical VTE ppx for now - SCDs if US dopplers negative  - f/u AM CBC

## 2022-02-03 NOTE — ED PROVIDER NOTE - CLINICAL SUMMARY MEDICAL DECISION MAKING FREE TEXT BOX
nausea, vomiting, diarrhea, etoh and heroin abuse, requesting detox, abdominal pain, f/u ct chest/abdomen/pelvis, US gallbladder, labs, ativan, zofran, iv fluids

## 2022-02-03 NOTE — ED ADULT NURSE NOTE - NSIMPLEMENTINTERV_GEN_ALL_ED
Implemented All Universal Safety Interventions:  [place] to call system. Call bell, personal items and telephone within reach. Instruct patient to call for assistance. Room bathroom lighting operational. Non-slip footwear when patient is off stretcher. Physically safe environment: no spills, clutter or unnecessary equipment. Stretcher in lowest position, wheels locked, appropriate side rails in place.

## 2022-02-03 NOTE — H&P ADULT - NSHPPHYSICALEXAM_GEN_ALL_CORE
T(C): 36.4 (02-03-22 @ 15:24), Max: 36.4 (02-03-22 @ 15:24)  HR: 77 (02-03-22 @ 15:24) (77 - 77)  BP: 164/91 (02-03-22 @ 15:24) (164/91 - 164/91)  RR: 18 (02-03-22 @ 15:24) (18 - 18)  SpO2: 95% (02-03-22 @ 15:24) (95% - 95%) T(C): 36.4 (02-03-22 @ 15:24), Max: 36.4 (02-03-22 @ 15:24)  HR: 77 (02-03-22 @ 15:24) (77 - 77)  BP: 164/91 (02-03-22 @ 15:24) (164/91 - 164/91)  RR: 18 (02-03-22 @ 15:24) (18 - 18)  SpO2: 95% (02-03-22 @ 15:24) (95% - 95%)    Physical Exam:  Gen: well appearing, NAD  HEENT: NCAT, PEERLA b/l, EOMI b/l, no conjunctival erythema  Cardio: regular rate and rhythm, +s1s2, no murmurs, rubs, or gallops  Pulm: CTA b/l, no wheezes, rales or rhonchi  Abdomen: soft, nontender, nondistended, +BS x4 quadrants, no guarding  Extremities: no clubbing, cyanosis or edema, +2 pedal pulses  Neuro: AAOx3, CNII-XII intact grossly, 5/5 strength all extremities, sensation intact  Skin: warm and dry T(C): 36.4 (02-03-22 @ 15:24), Max: 36.4 (02-03-22 @ 15:24)  HR: 77 (02-03-22 @ 15:24) (77 - 77)  BP: 164/91 (02-03-22 @ 15:24) (164/91 - 164/91)  RR: 18 (02-03-22 @ 15:24) (18 - 18)  SpO2: 95% (02-03-22 @ 15:24) (95% - 95%)    Physical Exam:  Gen: well appearing, NAD  HEENT: NCAT, PEERLA b/l, EOMI b/l, no conjunctival erythema  Cardio: regular rate and rhythm, +s1s2, no murmurs, rubs, or gallops  Pulm: CTA b/l, no wheezes, rales or rhonchi  Abdomen: soft, diffusely tender to palpation, distended with ascites, normoactive bowel sounds in all 4 quadrants   Extremities: no clubbing, cyanosis or edema, +2 pedal pulses  Neuro: AAOx3, shaking 2/2 to alcohol withdrawal   Skin: warm and dry T(C): 36.4 (02-03-22 @ 15:24), Max: 36.4 (02-03-22 @ 15:24)  HR: 77 (02-03-22 @ 15:24) (77 - 77)  BP: 164/91 (02-03-22 @ 15:24) (164/91 - 164/91)  RR: 18 (02-03-22 @ 15:24) (18 - 18)  SpO2: 95% (02-03-22 @ 15:24) (95% - 95%)    Gen: well appearing, NAD  HEENT: NCAT, PERRL b/l, EOMI b/l, no conjunctival erythema  Cardio: regular rate and rhythm, +s1s2, no murmurs, rubs, or gallops  Pulm: CTA b/l, no wheezes, rales or rhonchi  Abdomen: soft, diffusely tender to palpation, distended with ascites, normoactive bowel sounds in all 4 quadrants   Extremities: no clubbing, cyanosis; b/l LE and UE edema, +2 pedal pulses  Neuro: AAOx3, moves all extremities, sensation to light touch intact; tremor noted   Skin: warm and dry

## 2022-02-03 NOTE — H&P ADULT - NSICDXPASTSURGICALHX_GEN_ALL_CORE_FT
PAST SURGICAL HISTORY:  No significant past surgical history      PAST SURGICAL HISTORY:  No significant past surgical history liposuction

## 2022-02-03 NOTE — H&P ADULT - NSHPSOCIALHISTORY_GEN_ALL_CORE
Lives at home with fijohana and older son  4+ 32oz malt liquor/day  Current smoker, 1-1.5ppd for 27 years  History of heroin use - was on methadone  Relapsed and states uses 2 bags at a time q3hrs  Ambulatory at baseline Lives at home with fijohana and older son  8-10 32oz malt liquor/day  Current smoker, 1-1.5ppd for 27 years  History of heroin use - was on methadone  Relapsed and states uses 2 bags at a time q3hrs  Ambulatory at baseline Lives at home with fijohana and older son  Drinks 8-10 32oz malt liquor/day  Current smoker, 1-1.5ppd for 27 years  History of heroin use - was on methadone  Relapsed and states uses 2 bags at a time q3hrs  Ambulatory at baseline

## 2022-02-03 NOTE — H&P ADULT - NSHPLABSRESULTS_GEN_ALL_CORE
.  Labs, Imaging and EKG all personally reviewed by the attending physician. Labs, Imaging and EKG all personally reviewed by the attending physician.

## 2022-02-03 NOTE — ED PROVIDER NOTE - CARE PLAN
Principal Discharge DX:	Elevated liver function tests  Secondary Diagnosis:	Alcohol dependence with withdrawal  Secondary Diagnosis:	Opiate addiction   1

## 2022-02-03 NOTE — ED ADULT TRIAGE NOTE - CHIEF COMPLAINT QUOTE
patient came in ED from home requesting detox, nausea and vomiting. last alcohol intake was beer 32oz.

## 2022-02-03 NOTE — H&P ADULT - ASSESSMENT
**CHARTING IN PROGRESS** 41F with PMHx of EtOH use disorder (now relapsed), cirrhosis, IVDU (heroin), HTN p/w abdominal pain admitted for alcohol detoxification and acute on chronic liver failure with jaundice. 41F with PMHx of EtOH use disorder (now relapsed), cirrhosis, IVDU (heroin), HTN p/w abdominal pain admitted for alcohol detoxification and hepatic cirrhosis with reported hematemesis and jaundice.

## 2022-02-03 NOTE — ED ADULT NURSE NOTE - ED STAT RN HANDOFF DETAILS 2
Received the patient in the Er  at the time of change of shift. Patient is alert and oriented. Resting comfortably. Pending for bed availability

## 2022-02-03 NOTE — ED ADULT NURSE NOTE - OBJECTIVE STATEMENT
Patient states she has cirrhosis stage 4, and has been vomiting blood and is requesting detox from alcohol.  She states her last drink was last night at 0300 AM

## 2022-02-04 DIAGNOSIS — Z29.9 ENCOUNTER FOR PROPHYLACTIC MEASURES, UNSPECIFIED: ICD-10-CM

## 2022-02-04 DIAGNOSIS — D69.6 THROMBOCYTOPENIA, UNSPECIFIED: ICD-10-CM

## 2022-02-04 DIAGNOSIS — K52.9 NONINFECTIVE GASTROENTERITIS AND COLITIS, UNSPECIFIED: ICD-10-CM

## 2022-02-04 DIAGNOSIS — R09.89 OTHER SPECIFIED SYMPTOMS AND SIGNS INVOLVING THE CIRCULATORY AND RESPIRATORY SYSTEMS: ICD-10-CM

## 2022-02-04 DIAGNOSIS — F19.10 OTHER PSYCHOACTIVE SUBSTANCE ABUSE, UNCOMPLICATED: ICD-10-CM

## 2022-02-04 DIAGNOSIS — K74.60 UNSPECIFIED CIRRHOSIS OF LIVER: ICD-10-CM

## 2022-02-04 LAB
ALBUMIN SERPL ELPH-MCNC: 1.8 G/DL — LOW (ref 3.3–5)
ALBUMIN SERPL ELPH-MCNC: 1.9 G/DL — LOW (ref 3.3–5)
ALP SERPL-CCNC: 212 U/L — HIGH (ref 40–120)
ALP SERPL-CCNC: 217 U/L — HIGH (ref 40–120)
ALT FLD-CCNC: 56 U/L — SIGNIFICANT CHANGE UP (ref 12–78)
ALT FLD-CCNC: 58 U/L — SIGNIFICANT CHANGE UP (ref 12–78)
ANION GAP SERPL CALC-SCNC: 3 MMOL/L — LOW (ref 5–17)
ANION GAP SERPL CALC-SCNC: 6 MMOL/L — SIGNIFICANT CHANGE UP (ref 5–17)
AST SERPL-CCNC: 160 U/L — HIGH (ref 15–37)
AST SERPL-CCNC: 162 U/L — HIGH (ref 15–37)
BASOPHILS # BLD AUTO: 0.01 K/UL — SIGNIFICANT CHANGE UP (ref 0–0.2)
BASOPHILS NFR BLD AUTO: 0.7 % — SIGNIFICANT CHANGE UP (ref 0–2)
BILIRUB DIRECT SERPL-MCNC: 1.3 MG/DL — HIGH (ref 0–0.3)
BILIRUB INDIRECT FLD-MCNC: 0.4 MG/DL — SIGNIFICANT CHANGE UP (ref 0.2–1)
BILIRUB SERPL-MCNC: 1.6 MG/DL — HIGH (ref 0.2–1.2)
BILIRUB SERPL-MCNC: 1.7 MG/DL — HIGH (ref 0.2–1.2)
BUN SERPL-MCNC: 4 MG/DL — LOW (ref 7–23)
BUN SERPL-MCNC: 4 MG/DL — LOW (ref 7–23)
CALCIUM SERPL-MCNC: 7.5 MG/DL — LOW (ref 8.5–10.1)
CALCIUM SERPL-MCNC: 7.8 MG/DL — LOW (ref 8.5–10.1)
CHLORIDE SERPL-SCNC: 115 MMOL/L — HIGH (ref 96–108)
CHLORIDE SERPL-SCNC: 116 MMOL/L — HIGH (ref 96–108)
CO2 SERPL-SCNC: 25 MMOL/L — SIGNIFICANT CHANGE UP (ref 22–31)
CO2 SERPL-SCNC: 27 MMOL/L — SIGNIFICANT CHANGE UP (ref 22–31)
CREAT SERPL-MCNC: 0.51 MG/DL — SIGNIFICANT CHANGE UP (ref 0.5–1.3)
CREAT SERPL-MCNC: 0.51 MG/DL — SIGNIFICANT CHANGE UP (ref 0.5–1.3)
CULTURE RESULTS: SIGNIFICANT CHANGE UP
EOSINOPHIL # BLD AUTO: 0.04 K/UL — SIGNIFICANT CHANGE UP (ref 0–0.5)
EOSINOPHIL NFR BLD AUTO: 2.7 % — SIGNIFICANT CHANGE UP (ref 0–6)
GLUCOSE SERPL-MCNC: 79 MG/DL — SIGNIFICANT CHANGE UP (ref 70–99)
GLUCOSE SERPL-MCNC: 92 MG/DL — SIGNIFICANT CHANGE UP (ref 70–99)
HCT VFR BLD CALC: 30.8 % — LOW (ref 34.5–45)
HGB BLD-MCNC: 10.5 G/DL — LOW (ref 11.5–15.5)
IMM GRANULOCYTES NFR BLD AUTO: 0 % — SIGNIFICANT CHANGE UP (ref 0–1.5)
LYMPHOCYTES # BLD AUTO: 0.87 K/UL — LOW (ref 1–3.3)
LYMPHOCYTES # BLD AUTO: 58.8 % — HIGH (ref 13–44)
MAGNESIUM SERPL-MCNC: 1.7 MG/DL — SIGNIFICANT CHANGE UP (ref 1.6–2.6)
MCHC RBC-ENTMCNC: 32.3 PG — SIGNIFICANT CHANGE UP (ref 27–34)
MCHC RBC-ENTMCNC: 34.1 GM/DL — SIGNIFICANT CHANGE UP (ref 32–36)
MCV RBC AUTO: 94.8 FL — SIGNIFICANT CHANGE UP (ref 80–100)
MONOCYTES # BLD AUTO: 0.11 K/UL — SIGNIFICANT CHANGE UP (ref 0–0.9)
MONOCYTES NFR BLD AUTO: 7.4 % — SIGNIFICANT CHANGE UP (ref 2–14)
NEUTROPHILS # BLD AUTO: 0.45 K/UL — LOW (ref 1.8–7.4)
NEUTROPHILS NFR BLD AUTO: 30.4 % — LOW (ref 43–77)
NRBC # BLD: 0 /100 WBCS — SIGNIFICANT CHANGE UP (ref 0–0)
PHOSPHATE SERPL-MCNC: 3 MG/DL — SIGNIFICANT CHANGE UP (ref 2.5–4.5)
PLATELET # BLD AUTO: 51 K/UL — LOW (ref 150–400)
POTASSIUM SERPL-MCNC: 3.4 MMOL/L — LOW (ref 3.5–5.3)
POTASSIUM SERPL-MCNC: 3.5 MMOL/L — SIGNIFICANT CHANGE UP (ref 3.5–5.3)
POTASSIUM SERPL-SCNC: 3.4 MMOL/L — LOW (ref 3.5–5.3)
POTASSIUM SERPL-SCNC: 3.5 MMOL/L — SIGNIFICANT CHANGE UP (ref 3.5–5.3)
PROT SERPL-MCNC: 6 G/DL — SIGNIFICANT CHANGE UP (ref 6–8.3)
PROT SERPL-MCNC: 6.1 G/DL — SIGNIFICANT CHANGE UP (ref 6–8.3)
RBC # BLD: 3.25 M/UL — LOW (ref 3.8–5.2)
RBC # FLD: 14.6 % — HIGH (ref 10.3–14.5)
SODIUM SERPL-SCNC: 146 MMOL/L — HIGH (ref 135–145)
SODIUM SERPL-SCNC: 146 MMOL/L — HIGH (ref 135–145)
SPECIMEN SOURCE: SIGNIFICANT CHANGE UP
WBC # BLD: 1.48 K/UL — LOW (ref 3.8–10.5)
WBC # FLD AUTO: 1.48 K/UL — LOW (ref 3.8–10.5)

## 2022-02-04 PROCEDURE — 99232 SBSQ HOSP IP/OBS MODERATE 35: CPT

## 2022-02-04 PROCEDURE — 93970 EXTREMITY STUDY: CPT | Mod: 26

## 2022-02-04 PROCEDURE — 93010 ELECTROCARDIOGRAM REPORT: CPT

## 2022-02-04 RX ORDER — THIAMINE MONONITRATE (VIT B1) 100 MG
100 TABLET ORAL DAILY
Refills: 0 | Status: DISCONTINUED | OUTPATIENT
Start: 2022-02-04 | End: 2022-02-06

## 2022-02-04 RX ORDER — LACTULOSE 10 G/15ML
30 SOLUTION ORAL
Qty: 0 | Refills: 0 | DISCHARGE

## 2022-02-04 RX ORDER — TRAZODONE HCL 50 MG
1 TABLET ORAL
Qty: 0 | Refills: 0 | DISCHARGE

## 2022-02-04 RX ORDER — THIAMINE MONONITRATE (VIT B1) 100 MG
1 TABLET ORAL
Qty: 0 | Refills: 0 | DISCHARGE

## 2022-02-04 RX ORDER — SODIUM CHLORIDE 9 MG/ML
1000 INJECTION INTRAMUSCULAR; INTRAVENOUS; SUBCUTANEOUS
Refills: 0 | Status: DISCONTINUED | OUTPATIENT
Start: 2022-02-04 | End: 2022-02-05

## 2022-02-04 RX ORDER — NICOTINE POLACRILEX 2 MG
4 GUM BUCCAL
Refills: 0 | Status: DISCONTINUED | OUTPATIENT
Start: 2022-02-04 | End: 2022-02-04

## 2022-02-04 RX ORDER — ONDANSETRON 8 MG/1
4 TABLET, FILM COATED ORAL EVERY 8 HOURS
Refills: 0 | Status: DISCONTINUED | OUTPATIENT
Start: 2022-02-04 | End: 2022-02-06

## 2022-02-04 RX ORDER — NICOTINE POLACRILEX 2 MG
4 GUM BUCCAL
Refills: 0 | Status: DISCONTINUED | OUTPATIENT
Start: 2022-02-04 | End: 2022-02-06

## 2022-02-04 RX ORDER — METHADONE HYDROCHLORIDE 40 MG/1
5 TABLET ORAL
Refills: 0 | Status: DISCONTINUED | OUTPATIENT
Start: 2022-02-04 | End: 2022-02-06

## 2022-02-04 RX ORDER — PANTOPRAZOLE SODIUM 20 MG/1
40 TABLET, DELAYED RELEASE ORAL DAILY
Refills: 0 | Status: DISCONTINUED | OUTPATIENT
Start: 2022-02-04 | End: 2022-02-06

## 2022-02-04 RX ORDER — MAGNESIUM HYDROXIDE 400 MG/1
30 TABLET, CHEWABLE ORAL
Qty: 0 | Refills: 0 | DISCHARGE

## 2022-02-04 RX ORDER — ACETAMINOPHEN 500 MG
2 TABLET ORAL
Qty: 0 | Refills: 0 | DISCHARGE

## 2022-02-04 RX ORDER — HYDROXYZINE HCL 10 MG
25 TABLET ORAL
Refills: 0 | Status: DISCONTINUED | OUTPATIENT
Start: 2022-02-04 | End: 2022-02-06

## 2022-02-04 RX ORDER — FOLIC ACID 0.8 MG
1 TABLET ORAL
Qty: 0 | Refills: 0 | DISCHARGE

## 2022-02-04 RX ORDER — NICOTINE POLACRILEX 2 MG
1 GUM BUCCAL DAILY
Refills: 0 | Status: DISCONTINUED | OUTPATIENT
Start: 2022-02-04 | End: 2022-02-06

## 2022-02-04 RX ORDER — METHADONE HYDROCHLORIDE 40 MG/1
90 TABLET ORAL
Qty: 0 | Refills: 0 | DISCHARGE

## 2022-02-04 RX ORDER — FOLIC ACID 0.8 MG
1 TABLET ORAL DAILY
Refills: 0 | Status: DISCONTINUED | OUTPATIENT
Start: 2022-02-04 | End: 2022-02-06

## 2022-02-04 RX ORDER — HYDROXYZINE HCL 10 MG
1 TABLET ORAL
Qty: 0 | Refills: 0 | DISCHARGE

## 2022-02-04 RX ORDER — IBUPROFEN 200 MG
1 TABLET ORAL
Qty: 0 | Refills: 0 | DISCHARGE

## 2022-02-04 RX ORDER — METHADONE HYDROCHLORIDE 40 MG/1
9 TABLET ORAL
Qty: 0 | Refills: 0 | DISCHARGE

## 2022-02-04 RX ORDER — NICOTINE POLACRILEX 2 MG
1 GUM BUCCAL
Qty: 0 | Refills: 0 | DISCHARGE

## 2022-02-04 RX ORDER — LANOLIN ALCOHOL/MO/W.PET/CERES
3 CREAM (GRAM) TOPICAL AT BEDTIME
Refills: 0 | Status: DISCONTINUED | OUTPATIENT
Start: 2022-02-04 | End: 2022-02-06

## 2022-02-04 RX ADMIN — Medication 100 MILLIGRAM(S): at 11:33

## 2022-02-04 RX ADMIN — Medication 2 MILLIGRAM(S): at 06:08

## 2022-02-04 RX ADMIN — Medication 1 PATCH: at 11:32

## 2022-02-04 RX ADMIN — Medication 2 MILLIGRAM(S): at 11:08

## 2022-02-04 RX ADMIN — Medication 1 MILLIGRAM(S): at 01:28

## 2022-02-04 RX ADMIN — Medication 1 MILLIGRAM(S): at 11:33

## 2022-02-04 RX ADMIN — Medication 2 MILLIGRAM(S): at 20:55

## 2022-02-04 RX ADMIN — SODIUM CHLORIDE 75 MILLILITER(S): 9 INJECTION INTRAMUSCULAR; INTRAVENOUS; SUBCUTANEOUS at 18:26

## 2022-02-04 RX ADMIN — Medication 1 PATCH: at 19:30

## 2022-02-04 RX ADMIN — Medication 25 MILLIGRAM(S): at 23:45

## 2022-02-04 RX ADMIN — METHADONE HYDROCHLORIDE 5 MILLIGRAM(S): 40 TABLET ORAL at 12:53

## 2022-02-04 RX ADMIN — Medication 3 MILLIGRAM(S): at 23:45

## 2022-02-04 RX ADMIN — Medication 1 TABLET(S): at 11:33

## 2022-02-04 RX ADMIN — ONDANSETRON 4 MILLIGRAM(S): 8 TABLET, FILM COATED ORAL at 01:26

## 2022-02-04 RX ADMIN — Medication 2 MILLIGRAM(S): at 18:26

## 2022-02-04 RX ADMIN — METHADONE HYDROCHLORIDE 5 MILLIGRAM(S): 40 TABLET ORAL at 23:38

## 2022-02-04 RX ADMIN — Medication 100 MILLIGRAM(S): at 06:07

## 2022-02-04 NOTE — CONSULT NOTE ADULT - SUBJECTIVE AND OBJECTIVE BOX
Date/Time Patient Seen:  		  Referring MD:   Data Reviewed	       Patient is a 41y old  Female who presents with a chief complaint of alcohol detoxification, abdominal pain & jaundice (03 Feb 2022 23:52)      Subjective/HPI  vs noted  labs reviewed  H and P reviewed  ER provider note reviewed  CT chest reviewed     History of Present Illness:   41F with PMHx of EtOH use disorder (now relapsed), cirrhosis, IVDU (heroin), HTN presented to the ED with abdominal pain which comes and goes & has been present for the past month. She also states that her entire body is swelling (hands, legs, abdomen) over the last month. She has had progressive symptoms and came in as she was worsening and getting jaundiced. She quit drinking for years and relapsed a few months ago, drinking 8-10 32oz malt liquor beverages a day. She also relapsed with heroin as well. Using 2+bags and said she needs it about every three hours. She would like to get referred to methadone program - she was in this in the past, since she was 20. She would also like to detox from alcohol - either inpatient or with outpatient resources. States that her last drink was 3AM on 2/3/22. Has history of severe withdrawal. She also states she has been vomiting scant amount of blood since past few months. She is currently not on any medications.  PAST MEDICAL & SURGICAL HISTORY:  Hypertension    Alcohol abuse    Drug abuse    Hepatitis C    IVDU (intravenous drug user)    ETOH abuse    Hepatitis C    Cirrhosis    No significant past surgical history    No significant past surgical history    No significant past surgical history  liposuction    FAMILY HISTORY:  FH: lung cancer, mom    Father  Still living? Unknown  FHx: alcoholism, Age at diagnosis: Age Unknown.     Social History:  Social History (marital status, living situation, occupation, tobacco use, alcohol and drug use, and sexual history): Lives at home with fiance and older son  Drinks 8-10 32oz malt liquor/day  Current smoker, 1-1.5ppd for 27 years  History of heroin use - was on methadone  Relapsed and states uses 2 bags at a time q3hrs  Ambulatory at baseline       Tobacco Screening:  · Core Measure Site	Yes  · Has the patient used tobacco in the past 30 days?	Yes  · Tobacco Cessation Education/Counseling	Offered and provided  · Tobacco Cessation Medication	Offered and patient accepted    Risk Assessment:    Present on Admission:  Deep Venous Thrombosis	suspected  Pulmonary Embolus	no     Heart Failure:  Does this patient have a history of or has been diagnosed with heart failure? unknown.    HIV Screen (per [facility] Department of Health, HIV screening must be offered to every individual between ages 13 and 64)	Unable to offer due to clinical condition  Screening uterine cytology (Pap smear) performed in last 3 years	no         Medication list         MEDICATIONS  (STANDING):  folic acid 1 milliGRAM(s) Oral daily  LORazepam   Injectable 2 milliGRAM(s) IV Push every 4 hours  multivitamin 1 Tablet(s) Oral daily  nicotine - 21 mG/24Hr(s) Patch 1 patch Transdermal daily  thiamine 100 milliGRAM(s) Oral daily    MEDICATIONS  (PRN):  LORazepam   Injectable 2 milliGRAM(s) IV Push every 2 hours PRN CIWA-Ar score increase by 2 points and a total score of 7 or less  LORazepam   Injectable 2 milliGRAM(s) IV Push every 1 hour PRN CIWA-Ar score 8 or greater  melatonin 3 milliGRAM(s) Oral at bedtime PRN Insomnia  nicotine  Polacrilex Gum 4 milliGRAM(s) Oral every 2 hours PRN nicotine cravings  ondansetron Injectable 4 milliGRAM(s) IV Push every 8 hours PRN Nausea and/or Vomiting         Vitals log        ICU Vital Signs Last 24 Hrs  T(C): 36.4 (03 Feb 2022 15:24), Max: 36.4 (03 Feb 2022 15:24)  T(F): 97.5 (03 Feb 2022 15:24), Max: 97.5 (03 Feb 2022 15:24)  HR: 77 (03 Feb 2022 15:24) (77 - 77)  BP: 164/91 (03 Feb 2022 15:24) (164/91 - 164/91)  BP(mean): --  ABP: --  ABP(mean): --  RR: 18 (03 Feb 2022 15:24) (18 - 18)  SpO2: 95% (03 Feb 2022 15:24) (95% - 95%)           Input and Output:  I&O's Detail      Lab Data                        10.5   1.48  )-----------( 51       ( 04 Feb 2022 05:07 )             30.8     02-04    146<H>  |  116<H>  |  4<L>  ----------------------------<  92  3.5   |  27  |  0.51    Ca    7.8<L>      04 Feb 2022 01:04  Phos  2.6     02-03  Mg     1.6     02-03    TPro  6.9  /  Alb  2.3<L>  /  TBili  2.2<H>  /  DBili  x   /  AST  189<H>  /  ALT  67  /  AlkPhos  221<H>  02-03            Review of Systems	      Objective     Physical Examination        Pertinent Lab findings & Imaging      Calero:  NO   Adequate UO     I&O's Detail           Discussed with:     Cultures:	        Radiology    ACC: [id number] EXAM:  US DPLX LWR EXT VEINS COMPL BI                          PROCEDURE DATE:  02/04/2022          INTERPRETATION:  CLINICAL INFORMATION: Bilateral lower extremity swelling   with left lower extremity tenderness.    COMPARISON: None available.    TECHNIQUE: Duplex sonography of the BILATERAL LOWER extremity veins with   color and spectral Doppler, with and without compression.    FINDINGS:    RIGHT:  Normal compressibility of the RIGHT common femoral, femoral and popliteal   veins.  Doppler examination shows normal spontaneous and phasic flow.  No RIGHT calf vein thrombosis is detected.    LEFT:  Normal compressibility of the LEFT common femoral, femoral and popliteal   veins.  Doppler examination shows normal spontaneous and phasic flow.  No LEFT calf vein thrombosis is detected.  Left popliteal fossa cystic lesion measuring 4.9 x 1.6 x 3.7 cm likely   representing a Baker's cyst.    IMPRESSION:  No evidence of deep venous thrombosis in either lower extremity.      Left popliteal fossa Baker's cyst.    --- End of Report ---            MAMTA LOTT DO; Attending Radiologist  This document has been electronically signed. Feb 4 2022  4:11AM          COMPARISON: None.    CONTRAST/COMPLICATIONS:  IV Contrast: IV contrast documented in associated exam (accession   [id number]), Omnipaque 350 (accession [id number])  90 cc administered   10 cc   discarded  Oral Contrast: NONE  Complications: None reported at time of study completion    PROCEDURE:  CT of the Chest, Abdomen and Pelvis was performed.  Sagittal and coronal reformats were performed.    FINDINGS:  CHEST:  LUNGS AND LARGE AIRWAYS: Patent central airways. No pulmonary nodules.  PLEURA: No pleural effusion.  VESSELS: Within normal limits.  HEART: Heart size is normal. No pericardial effusion.  MEDIASTINUM AND J LUIS: No lymphadenopathy.  CHEST WALL AND LOWER NECK: Within normal limits.    ABDOMEN AND PELVIS:  LIVER: Cirrhosis.  BILE DUCTS: Normal caliber.  GALLBLADDER: Contracted, with nonspecific wall thickening, as on prior.  SPLEEN: Splenomegaly.  PANCREAS: Within normal limits.  ADRENALS: Within normal limits.  KIDNEYS/URETERS: Within normal limits.    BLADDER: Within normal limits.  REPRODUCTIVE ORGANS: Uterus and adnexa within normal limits.    BOWEL: No bowel obstruction. Colonic bowel wall thickening extending from   the cecum through the sigmoid colon, most prominent proximally. Appendix   is normal.  PERITONEUM: Small ascites.  VESSELS: Within normal limits. Patent portal, splenic, and superior   mesenteric veins.  RETROPERITONEUM/LYMPH NODES: No lymphadenopathy.  ABDOMINAL WALL: Anasarca.  BONES: Within normal limits.    IMPRESSION:    No acute intrathoracic pathology on CT.    Cirrhosis, splenomegaly, and small intra-abdominal ascites.    Colonic bowel wall thickening extending from the cecum through the   sigmoid colon, which may be related to portal colopathy versus colitis,   as seen on prior examination of 1/26/2022.        --- End of Report ---            BRENNEN MADISON MD; Attending Radiologist  This document has been electronically signed. Feb  3 2022  9:45PM                   Date/Time Patient Seen:  		  Referring MD:   Data Reviewed	       Patient is a 41y old  Female who presents with a chief complaint of alcohol detoxification, abdominal pain & jaundice (03 Feb 2022 23:52)      Subjective/HPI  vs noted  labs reviewed  H and P reviewed  ER provider note reviewed  CT chest reviewed     History of Present Illness:   41F with PMHx of EtOH use disorder (now relapsed), cirrhosis, IVDU (heroin), HTN presented to the ED with abdominal pain which comes and goes & has been present for the past month. She also states that her entire body is swelling (hands, legs, abdomen) over the last month. She has had progressive symptoms and came in as she was worsening and getting jaundiced. She quit drinking for years and relapsed a few months ago, drinking 8-10 32oz malt liquor beverages a day. She also relapsed with heroin as well. Using 2+bags and said she needs it about every three hours. She would like to get referred to methadone program - she was in this in the past, since she was 20. She would also like to detox from alcohol - either inpatient or with outpatient resources. States that her last drink was 3AM on 2/3/22. Has history of severe withdrawal. She also states she has been vomiting scant amount of blood since past few months. She is currently not on any medications.  PAST MEDICAL & SURGICAL HISTORY:  Hypertension    Alcohol abuse    Drug abuse    Hepatitis C    IVDU (intravenous drug user)    ETOH abuse    Hepatitis C    Cirrhosis    No significant past surgical history    No significant past surgical history    No significant past surgical history  liposuction    FAMILY HISTORY:  FH: lung cancer, mom    Father  Still living? Unknown  FHx: alcoholism, Age at diagnosis: Age Unknown.     Social History:  Social History (marital status, living situation, occupation, tobacco use, alcohol and drug use, and sexual history): Lives at home with fiance and older son  Drinks 8-10 32oz malt liquor/day  Current smoker, 1-1.5ppd for 27 years  History of heroin use - was on methadone  Relapsed and states uses 2 bags at a time q3hrs  Ambulatory at baseline       Tobacco Screening:  · Core Measure Site	Yes  · Has the patient used tobacco in the past 30 days?	Yes  · Tobacco Cessation Education/Counseling	Offered and provided  · Tobacco Cessation Medication	Offered and patient accepted    Risk Assessment:    Present on Admission:  Deep Venous Thrombosis	suspected  Pulmonary Embolus	no     Heart Failure:  Does this patient have a history of or has been diagnosed with heart failure? unknown.    HIV Screen (per [facility] Department of Health, HIV screening must be offered to every individual between ages 13 and 64)	Unable to offer due to clinical condition  Screening uterine cytology (Pap smear) performed in last 3 years	no         Medication list         MEDICATIONS  (STANDING):  folic acid 1 milliGRAM(s) Oral daily  LORazepam   Injectable 2 milliGRAM(s) IV Push every 4 hours  multivitamin 1 Tablet(s) Oral daily  nicotine - 21 mG/24Hr(s) Patch 1 patch Transdermal daily  thiamine 100 milliGRAM(s) Oral daily    MEDICATIONS  (PRN):  LORazepam   Injectable 2 milliGRAM(s) IV Push every 2 hours PRN CIWA-Ar score increase by 2 points and a total score of 7 or less  LORazepam   Injectable 2 milliGRAM(s) IV Push every 1 hour PRN CIWA-Ar score 8 or greater  melatonin 3 milliGRAM(s) Oral at bedtime PRN Insomnia  nicotine  Polacrilex Gum 4 milliGRAM(s) Oral every 2 hours PRN nicotine cravings  ondansetron Injectable 4 milliGRAM(s) IV Push every 8 hours PRN Nausea and/or Vomiting         Vitals log        ICU Vital Signs Last 24 Hrs  T(C): 36.4 (03 Feb 2022 15:24), Max: 36.4 (03 Feb 2022 15:24)  T(F): 97.5 (03 Feb 2022 15:24), Max: 97.5 (03 Feb 2022 15:24)  HR: 77 (03 Feb 2022 15:24) (77 - 77)  BP: 164/91 (03 Feb 2022 15:24) (164/91 - 164/91)  BP(mean): --  ABP: --  ABP(mean): --  RR: 18 (03 Feb 2022 15:24) (18 - 18)  SpO2: 95% (03 Feb 2022 15:24) (95% - 95%)           Input and Output:  I&O's Detail      Lab Data                        10.5   1.48  )-----------( 51       ( 04 Feb 2022 05:07 )             30.8     02-04    146<H>  |  116<H>  |  4<L>  ----------------------------<  92  3.5   |  27  |  0.51    Ca    7.8<L>      04 Feb 2022 01:04  Phos  2.6     02-03  Mg     1.6     02-03    TPro  6.9  /  Alb  2.3<L>  /  TBili  2.2<H>  /  DBili  x   /  AST  189<H>  /  ALT  67  /  AlkPhos  221<H>  02-03            Review of Systems	  depressed  not working  using Heroin and Etoh       Objective     Physical Examination    heart s1s2  lung dec BS  abd soft  head nc  verbal  alert  tattoos  track marks      Pertinent Lab findings & Imaging      Calero:  NO   Adequate UO     I&O's Detail           Discussed with:     Cultures:	        Radiology    ACC: [id number] EXAM:  US DPLX LWR EXT VEINS COMPL BI                          PROCEDURE DATE:  02/04/2022          INTERPRETATION:  CLINICAL INFORMATION: Bilateral lower extremity swelling   with left lower extremity tenderness.    COMPARISON: None available.    TECHNIQUE: Duplex sonography of the BILATERAL LOWER extremity veins with   color and spectral Doppler, with and without compression.    FINDINGS:    RIGHT:  Normal compressibility of the RIGHT common femoral, femoral and popliteal   veins.  Doppler examination shows normal spontaneous and phasic flow.  No RIGHT calf vein thrombosis is detected.    LEFT:  Normal compressibility of the LEFT common femoral, femoral and popliteal   veins.  Doppler examination shows normal spontaneous and phasic flow.  No LEFT calf vein thrombosis is detected.  Left popliteal fossa cystic lesion measuring 4.9 x 1.6 x 3.7 cm likely   representing a Baker's cyst.    IMPRESSION:  No evidence of deep venous thrombosis in either lower extremity.      Left popliteal fossa Baker's cyst.    --- End of Report ---            MAMTA LOTT DO; Attending Radiologist  This document has been electronically signed. Feb 4 2022  4:11AM          COMPARISON: None.    CONTRAST/COMPLICATIONS:  IV Contrast: IV contrast documented in associated exam (accession   [id number]), Omnipaque 350 (accession [id number])  90 cc administered   10 cc   discarded  Oral Contrast: NONE  Complications: None reported at time of study completion    PROCEDURE:  CT of the Chest, Abdomen and Pelvis was performed.  Sagittal and coronal reformats were performed.    FINDINGS:  CHEST:  LUNGS AND LARGE AIRWAYS: Patent central airways. No pulmonary nodules.  PLEURA: No pleural effusion.  VESSELS: Within normal limits.  HEART: Heart size is normal. No pericardial effusion.  MEDIASTINUM AND J LUIS: No lymphadenopathy.  CHEST WALL AND LOWER NECK: Within normal limits.    ABDOMEN AND PELVIS:  LIVER: Cirrhosis.  BILE DUCTS: Normal caliber.  GALLBLADDER: Contracted, with nonspecific wall thickening, as on prior.  SPLEEN: Splenomegaly.  PANCREAS: Within normal limits.  ADRENALS: Within normal limits.  KIDNEYS/URETERS: Within normal limits.    BLADDER: Within normal limits.  REPRODUCTIVE ORGANS: Uterus and adnexa within normal limits.    BOWEL: No bowel obstruction. Colonic bowel wall thickening extending from   the cecum through the sigmoid colon, most prominent proximally. Appendix   is normal.  PERITONEUM: Small ascites.  VESSELS: Within normal limits. Patent portal, splenic, and superior   mesenteric veins.  RETROPERITONEUM/LYMPH NODES: No lymphadenopathy.  ABDOMINAL WALL: Anasarca.  BONES: Within normal limits.    IMPRESSION:    No acute intrathoracic pathology on CT.    Cirrhosis, splenomegaly, and small intra-abdominal ascites.    Colonic bowel wall thickening extending from the cecum through the   sigmoid colon, which may be related to portal colopathy versus colitis,   as seen on prior examination of 1/26/2022.        --- End of Report ---            BRENNEN MADISON MD; Attending Radiologist  This document has been electronically signed. Feb  3 2022  9:45PM

## 2022-02-04 NOTE — PROGRESS NOTE ADULT - PROBLEM SELECTOR PLAN 1
- Patient with diffuse abdominal tenderness, ascites and vomiting x 3-4 months, now with worsening jaundice and reported hematemesis  - Admit to F  - CT A/P: Cirrhosis, splenomegaly, and small intra-abdominal ascites. Colonic bowel wall thickening extending from the cecum through the sigmoid colon, which may be related to portal colopathy versus colitis, as seen on prior examination of 1/26/2022.  - US abdomen: Cirrhotic morphology of the liver. Small amount of ascites. Mildly prominent CBD.  - S/p NS x 1L, Zofran 4mg x2, and Protonix 40mg x1 in the ED  - Can consider SBP prophylaxis with Ceftriaxone if signs of hemodynamic instability   - Started on clear liquid diet  - GI Dr. Dunne consulted, f/u reccs - Patient with diffuse abdominal tenderness, ascites and vomiting x 3-4 months, now with worsening jaundice and reported hematemesis  - CT A/P: Cirrhosis, splenomegaly, and small intra-abdominal ascites. Colonic bowel wall thickening extending from the cecum through the sigmoid colon, which may be related to portal colopathy versus colitis, as seen on prior examination of 1/26/2022.  - US abdomen: Cirrhotic morphology of the liver. Small amount of ascites. Mildly prominent CBD.  - S/p NS x 1L, Zofran 4mg x2, and Protonix 40mg x1 in the ED; cont IVF NS until pt eating po well  - Can consider SBP prophylaxis with Ceftriaxone if signs of hemodynamic instability   - Started on clear liquid diet  - GI Dr. Dunne consulted, awaiting recs on role of abx

## 2022-02-04 NOTE — CONSULT NOTE ADULT - REASON FOR ADMISSION
[FreeTextEntry1] : last blood work d/w the pt at length\par 
alcohol detoxification, abdominal pain & jaundice
alcohol detoxification, abdominal pain & jaundice

## 2022-02-04 NOTE — CONSULT NOTE ADULT - SUBJECTIVE AND OBJECTIVE BOX
[facility]  Jamie Hitchcock PA-C  [address] [place] [address]  [phone]      Chief Complaint:  Patient is a 41y old  Female who presents with a chief complaint of alcohol detoxification, abdominal pain & jaundice (2022 05:33)    HPI:  41F with PMHx of EtOH use disorder (now relapsed), cirrhosis, IVDU (heroin), HTN presented to the ED with abdominal pain which comes and goes & has been present for the past month. She also states that her entire body is swelling (hands, legs, abdomen) over the last month. She has had progressive symptoms and came in as she was worsening and getting jaundiced. She quit drinking for years and relapsed a few months ago, drinking 8-10 32oz malt liquor beverages a day. She also relapsed with heroin as well. Using 2+bags and said she needs it about every three hours. She would like to get referred to methadone program - she was in this in the past, since she was 20. She would also like to detox from alcohol - either inpatient or with outpatient resources. States that her last drink was 3AM on 2/3/22. Has history of severe withdrawal. She also states she has been vomiting scant amount of blood since past few months. She is currently not on any medications.    ED Course:   Vitals: T 97.5, HR 77, /91, RR 18, SPO2 95% on RA  Labs: WBC 3.15, RBC 3.73, plt 83, pt 15.1, INR 1.31, PTT 43.3, K 3.4, Ca 7.9, UTOx (-), Blood alcohol 279, albumin 2.3, Tbili 2.2, alp 221, ast 189  EKG: Normal Sinus Rhythm  US LE: ordered & pending  CT chest: No acute intrathoracic pathology on CT.   CT A/P: Cirrhosis, splenomegaly, and small intra-abdominal ascites. Colonic bowel wall thickening extending from the cecum through the sigmoid colon, which may be related to portal colopathy versus colitis, as seen on prior examination of 2022.  US abdomen: Cirrhotic morphology of the liver. Small amount of ascites. Mildly prominent CBD.  Chest x-ray: wet read- no acute infiltrates  Given in the ED: NS 1L x2, Zofran 4mg x2, Protonix 40mg x1     GI was consulted for EtOH use disorder, abdominal pain, ?jaundice.  Pt reports she has hx of EtOH use disorder and recently went back to drinking alcohol "every few hours." She states she has also begun using heroin again. She states her last drink was yesterday prior to coming in.   Pt reports she did have streaks of blood in her vomit, last episode of vomiting was a few days ago  She states she thinks her eyes are becoming more yellow as well  Denies further GI complaints    Allergies:  Allergy Status Unknown  No Known Allergies      Medications:  aluminum hydroxide/magnesium hydroxide/simethicone Suspension 30 milliLiter(s) Oral every 4 hours PRN  folic acid 1 milliGRAM(s) Oral daily  hydrOXYzine hydrochloride 25 milliGRAM(s) Oral two times a day PRN  LORazepam   Injectable 2 milliGRAM(s) IV Push every 4 hours  LORazepam   Injectable 2 milliGRAM(s) IV Push every 2 hours PRN  LORazepam   Injectable 2 milliGRAM(s) IV Push every 1 hour PRN  melatonin 3 milliGRAM(s) Oral at bedtime PRN  methadone    Tablet 5 milliGRAM(s) Oral two times a day PRN  multivitamin 1 Tablet(s) Oral daily  nicotine  Polacrilex Gum 4 milliGRAM(s) Oral every 2 hours PRN  nicotine - 21 mG/24Hr(s) Patch 1 patch Transdermal daily  ondansetron Injectable 4 milliGRAM(s) IV Push every 8 hours PRN  thiamine 100 milliGRAM(s) Oral daily      PMHX/PSHX:  Hypertension    Alcohol abuse    Drug abuse    Hepatitis C    IVDU (intravenous drug user)    ETOH abuse    Hepatitis C    Cirrhosis    No significant past surgical history    No significant past surgical history    No significant past surgical history        Family history:  No pertinent family history in first degree relatives    FH: lung cancer    FHx: alcoholism (Father)        Social History:   EtOH use disorder  Heroin use    ROS:     General:  No wt loss, fevers, chills, night sweats, fatigue,   Eyes:  Good vision, no reported pain  ENT:  No sore throat, pain, runny nose, dysphagia  CV:  No pain, palpitations, hypo/hypertension  Resp:  No dyspnea, cough, tachypnea, wheezing  GI:  +pain, No nausea, +intermittent vomiting, No diarrhea, No constipation, No weight loss, No fever, No pruritis, No rectal bleeding, No tarry stools, No dysphagia,  :  No pain, bleeding, incontinence, nocturia  Muscle:  No pain, weakness  Neuro:  No weakness, tingling, memory problems  Psych:  No fatigue, insomnia, mood problems, depression  Endocrine:  No polyuria, polydipsia, cold/heat intolerance  Heme:  No petechiae, ecchymosis, easy bruisability  Skin:  No rash, tattoos, scars, edema      PHYSICAL EXAM:   Vital Signs:  Vital Signs Last 24 Hrs  T(C): 36.6 (2022 06:14), Max: 36.6 (2022 06:14)  T(F): 97.9 (2022 06:14), Max: 97.9 (2022 06:14)  HR: 74 (2022 11:01) (74 - 77)  BP: 120/60 (2022 11:01) (120/60 - 164/91)  BP(mean): --  RR: 16 (2022 11:01) (16 - 18)  SpO2: 95% (2022 11:01) (92% - 95%)  Daily Height in cm: 157.48 (2022 15:24)    Daily     GENERAL:  Appears stated age  HEENT:  +slightly icteric sclera  CHEST:  Full & symmetric excursion,   HEART:  Regular rhythm  ABDOMEN:  Soft, +tender to deep palpation diffusely, +mildly distended  EXTREMITIES  no cyanosis, clubbing  SKIN:  No rash  NEURO:  Alert    LABS:                        10.5   1.48  )-----------( 51       ( 2022 05:07 )             30.8     02-04    146<H>  |  115<H>  |  4<L>  ----------------------------<  79  3.4<L>   |  25  |  0.51    Ca    7.5<L>      2022 05:07  Phos  3.0     02-04  Mg     1.7     02-04    TPro  6.0  /  Alb  1.9<L>  /  TBili  1.6<H>  /  DBili  1.3<H>  /  AST  162<H>  /  ALT  56  /  AlkPhos  212<H>  02-    LIVER FUNCTIONS - ( 2022 05:07 )  Alb: 1.9 g/dL / Pro: 6.0 g/dL / ALK PHOS: 212 U/L / ALT: 56 U/L / AST: 162 U/L / GGT: x           PT/INR - ( 2022 19:54 )   PT: 15.1 sec;   INR: 1.31 ratio         PTT - ( 2022 19:54 )  PTT:43.3 sec  Urinalysis Basic - ( 2022 22:19 )    Color: Yellow / Appearance: Clear / S.010 / pH: x  Gluc: x / Ketone: Negative  / Bili: Negative / Urobili: 4   Blood: x / Protein: Negative / Nitrite: Negative   Leuk Esterase: Negative / RBC: x / WBC x   Sq Epi: x / Non Sq Epi: x / Bacteria: x      Amylase Serum--      Lipase [id number]       Ammonia--      Imaging:  < from: US Abdomen Upper Quadrant Right (22 @ 18:26) >    ACC: [id number] EXAM:  US ABDOMEN RT UPR QUADRANT                          PROCEDURE DATE:  2022          INTERPRETATION:  CLINICAL INFORMATION: Right-sided abdominal pain with   history of alcohol abuse    COMPARISON: Ultrasound of 2019 and CAT scan of 2022    TECHNIQUE: Sonography of the right upper quadrant.    FINDINGS:    Liver: Nodular contour consistent with cirrhosis.  Bile ducts: Normal caliber. Common bile duct measures 7 mm.  Gallbladder: Partly contracted without gross stones.  Pancreas: Visualized portions are within normal limits.  Right kidney: 10.7 cm. No hydronephrosis.  Ascites: Small amount.  IVC: Visualized portions are within normal limits.    IMPRESSION:    Cirrhotic morphology of the liver.  Small amount of ascites  Mildly prominent CBD.    --- End of Report ---    KAYLA CAZARES MD; Attending Radiologist  This document has been electronically signed. Feb  3 2022  6:31PM    < end of copied text >      < from: CT Abdomen and Pelvis w/ IV Cont (22 @ 21:13) >    ACC: [id number] EXAM:  CT ABDOMEN AND PELVIS IC                        ACC: [id number] EXAM:  CT CHEST IC                          PROCEDURE DATE:  2022          INTERPRETATION:  CLINICAL INFORMATION: Vomiting blood.    COMPARISON: None.    CONTRAST/COMPLICATIONS:  IV Contrast: IV contrast documented in associated exam (accession   [id number]), Omnipaque 350 (accession [id number])  90 cc administered   10 cc   discarded  Oral Contrast: NONE  Complications: None reported at time of study completion    PROCEDURE:  CT of the Chest, Abdomen and Pelvis was performed.  Sagittal and coronal reformats were performed.    FINDINGS:  CHEST:  LUNGS AND LARGE AIRWAYS: Patent central airways. No pulmonary nodules.  PLEURA: No pleural effusion.  VESSELS: Within normal limits.  HEART: Heart size is normal. No pericardial effusion.  MEDIASTINUM AND J LUIS: No lymphadenopathy.  CHEST WALL AND LOWER NECK: Within normal limits.    ABDOMEN AND PELVIS:  LIVER: Cirrhosis.  BILE DUCTS: Normal caliber.  GALLBLADDER: Contracted, with nonspecific wall thickening, as on prior.  SPLEEN: Splenomegaly.  PANCREAS: Within normal limits.  ADRENALS: Within normal limits.  KIDNEYS/URETERS: Within normal limits.    BLADDER: Within normal limits.  REPRODUCTIVE ORGANS: Uterus and adnexa within normal limits.    BOWEL: No bowel obstruction. Colonic bowel wall thickening extending from   the cecum through the sigmoid colon, most prominent proximally. Appendix   is normal.  PERITONEUM: Small ascites.  VESSELS: Within normallimits. Patent portal, splenic, and superior   mesenteric veins.  RETROPERITONEUM/LYMPH NODES: No lymphadenopathy.  ABDOMINAL WALL: Anasarca.  BONES: Within normal limits.    IMPRESSION:    No acute intrathoracic pathology on CT.    Cirrhosis, splenomegaly, and small intra-abdominal ascites.    Colonic bowel wall thickening extending from the cecum through the   sigmoid colon, which may be related to portal colopathy versus colitis,   as seen on prior examination of 2022.        --- End of Report ---            BRENNEN MADISON MD; Attending Radiologist  This document has been electronically signed. Feb  3 2022  9:45PM    < end of copied text >  < from: CT Abdomen and Pelvis w/ IV Cont (22 @ 21:13) >    ACC: [id number] EXAM:  CT ABDOMEN AND PELVIS IC                        ACC: [id number] EXAM:  CT CHEST IC                          PROCEDURE DATE:  2022          INTERPRETATION:  CLINICAL INFORMATION: Vomiting blood.    COMPARISON: None.    CONTRAST/COMPLICATIONS:  IV Contrast: IV contrast documented in associated exam (accession   [id number]), Omnipaque 350 (accession [id number])  90 cc administered   10 cc   discarded  Oral Contrast: NONE  Complications: None reported at time of study completion    PROCEDURE:  CT of the Chest, Abdomen and Pelvis was performed.  Sagittal and coronal reformats were performed.    FINDINGS:  CHEST:  LUNGS AND LARGE AIRWAYS: Patent central airways. No pulmonary nodules.  PLEURA: No pleural effusion.  VESSELS: Within normal limits.  HEART: Heart size is normal. No pericardial effusion.  MEDIASTINUM AND J LUIS: No lymphadenopathy.  CHEST WALL AND LOWER NECK: Within normal limits.    ABDOMEN AND PELVIS:  LIVER: Cirrhosis.  BILE DUCTS: Normal caliber.  GALLBLADDER: Contracted, with nonspecific wall thickening, as on prior.  SPLEEN: Splenomegaly.  PANCREAS: Within normal limits.  ADRENALS: Within normal limits.  KIDNEYS/URETERS: Within normal limits.    BLADDER: Within normal limits.  REPRODUCTIVE ORGANS: Uterus and adnexa within normal limits.    BOWEL: No bowel obstruction. Colonic bowel wall thickening extending from   the cecum through the sigmoid colon, most prominent proximally. Appendix   is normal.  PERITONEUM: Small ascites.  VESSELS: Within normallimits. Patent portal, splenic, and superior   mesenteric veins.  RETROPERITONEUM/LYMPH NODES: No lymphadenopathy.  ABDOMINAL WALL: Anasarca.  BONES: Within normal limits.    IMPRESSION:    No acute intrathoracic pathology on CT.    Cirrhosis, splenomegaly, and small intra-abdominal ascites.    Colonic bowel wall thickening extending from the cecum through the   sigmoid colon, which may be related to portal colopathy versus colitis,   as seen on prior examination of 2022.    --- End of Report ---      BRENNEN MADISON MD; Attending Radiologist  This document has been electronically signed. Feb  3 2022  9:45PM    < end of copied text >

## 2022-02-04 NOTE — PROGRESS NOTE ADULT - PROBLEM SELECTOR PLAN 2
Polysubstance use - EtOH use disorder and substance abuse disorder (w/ opioids)  Alcohol dependence with withdrawal - mild tremor on admission (s/p ativan by ER)  - Admits to consuming 8-10 32oz malt liquor/day  - Current smoker, 1-1.5ppd for 27 years  - History of heroin use - was on methadone, relapsed and states uses 2 bags at a time q3hrs  - She would like to be placed back on methadone/be referred to the clinic after detox  - CIWA protocol in place with symptom triggered ativan and ATC ativan for now  - Started on folic acid, thiamine and multivitamin   - NRT with nicotine patch and gum PRN  - Patient requesting for outpatient or inpatient detox - consult social work  - Addiction Medicine Dr. Miller Polysubstance use - EtOH use disorder and substance abuse disorder (w/ opioids)  Alcohol dependence with withdrawal - mild tremor on admission (s/p ativan by ER)  - Admits to consuming 8-10 32oz malt liquor/day  - Current smoker, 1-1.5ppd for 27 years; nicotine patch and gum prn  - History of heroin use - was on methadone, relapsed and states uses 2 bags at a time q3hrs  - She would like to be placed back on methadone/be referred to the clinic after detox; social work and addiction medicine following  - CIWA protocol in place with symptom triggered ativan and ATC ativan for now  - Started on folic acid, thiamine and multivitamin   - Patient requesting for outpatient or inpatient detox - consult social work  - Addiction Medicine Dr. Miller, care d/w Dr. Miller, while pt wanted higher dose of methadone, for now decision made to kept on 5 bid, will plan to arrange for bridge into outpt methadone clinic

## 2022-02-04 NOTE — CONSULT NOTE ADULT - ASSESSMENT
Abdominal pain  EtOH use disorder  Hematemesis    CT noted  US noted  Discussed imaging results with patient  Ascites appears too small for paracentesis  Monitor abdominal exam  Pt reports intermittent blood-streaked emesis  Monitor cbc daily  PPI for prophylaxis  Will tentatively schedule upper endoscopy for Monday (2/7)  Will follow    I reviewed the overnight course of events on the unit, re-confirming the patient history. I discussed the care with the patient and their family  Differential diagnosis and plan of care discussed with patient after the evaluation  35 minutes spent on total encounter of which more than fifty percent of the encounter was spent counseling and/or coordinating care by the attending physician.  Advanced care planning was discussed with patient and family.  Advanced care planning forms were reviewed and discussed.  Risks, benefits and alternatives of gastroenterologic procedures were discussed in detail and all questions were answered.   Abdominal pain  EtOH use disorder  Hematemesis    CT noted  US noted  Discussed imaging results with patient  Ascites appears too small for paracentesis  Monitor abdominal exam  Pt reports intermittent blood-streaked emesis  Monitor cbc daily  PPI for prophylaxis  monitor for gi bleed  Will follow    I reviewed the overnight course of events on the unit, re-confirming the patient history. I discussed the care with the patient and their family  Differential diagnosis and plan of care discussed with patient after the evaluation  35 minutes spent on total encounter of which more than fifty percent of the encounter was spent counseling and/or coordinating care by the attending physician.  Advanced care planning was discussed with patient and family.  Advanced care planning forms were reviewed and discussed.  Risks, benefits and alternatives of gastroenterologic procedures were discussed in detail and all questions were answered.

## 2022-02-04 NOTE — CONSULT NOTE ADULT - ASSESSMENT
41F with PMHx of EtOH use disorder (now relapsed), cirrhosis, IVDU (heroin), HTN p/w abdominal pain admitted for alcohol detoxification and hepatic cirrhosis with reported hematemesis and jaundice. 41F with PMHx of EtOH use disorder (now relapsed), cirrhosis, IVDU (heroin), HTN p/w abdominal pain admitted for alcohol detoxification and hepatic cirrhosis with reported hematemesis and jaundice.    colitis eval  hx of cirrhosis  Etoh use disorder - LUKE eval  Heroin use - Opioid dep - formerly on Methadone via [facility] clinic  Smoker  ADHD    on Ativan Schedule for LUKE - Etoh Use Disorder - BRANDON  CIWA - prn Ativan  Folic Acid - Thiamine -   Opioid dep - Methadone BID prn for w/d symptoms - use Mylanta - Tylenol - Atarax PRN for sx management - COWS scale for Opioid w/d sx assessment  Social work eval  smoking cess ed  counseling - education - emotional support  NRT  serial LABS - LFTs - [facility] kathryn  pt is not working now, lives with Merissa - has children -

## 2022-02-04 NOTE — PROGRESS NOTE ADULT - SUBJECTIVE AND OBJECTIVE BOX
SUBJECTIVE:    No acute events overnight, afebrile, hds.  Pt states she is feeling shaky, and expressing desire to be in methadone clinic.  No acute cp or sob.    VITAL SIGNS:    Vital Signs Last 24 Hrs  T(C): 36.7 (04 Feb 2022 15:10), Max: 36.7 (04 Feb 2022 15:10)  T(F): 98 (04 Feb 2022 15:10), Max: 98 (04 Feb 2022 15:10)  HR: 68 (04 Feb 2022 15:10) (68 - 75)  BP: 130/60 (04 Feb 2022 15:10) (120/60 - 139/75)  BP(mean): --  RR: 16 (04 Feb 2022 15:10) (16 - 16)  SpO2: 96% (04 Feb 2022 15:10) (92% - 96%)    PHYSICAL EXAM:     GENERAL: no acute distress, anxious, mildly tremulous  HEENT: EOMI, neck supple, MMM  RESPIRATORY: LCTAB/L, no rhonchi, rales, or wheezing  CARDIOVASCULAR: RRR, no murmurs, gallops, rubs  ABDOMINAL: soft, non-tender, non-distended, positive bowel sounds   EXTREMITIES: no clubbing, cyanosis, or edema  NEUROLOGICAL: alert and oriented x 3, non-focal  SKIN: no new rashes or lesions   MUSCULOSKELETAL: no gross joint deformity                          10.5   1.48  )-----------( 51       ( 04 Feb 2022 05:07 )             30.8     02-04    146<H>  |  115<H>  |  4<L>  ----------------------------<  79  3.4<L>   |  25  |  0.51    Ca    7.5<L>      04 Feb 2022 05:07  Phos  3.0     02-04  Mg     1.7     02-04    TPro  6.0  /  Alb  1.9<L>  /  TBili  1.6<H>  /  DBili  1.3<H>  /  AST  162<H>  /  ALT  56  /  AlkPhos  212<H>  02-04      CAPILLARY BLOOD GLUCOSE          MEDICATIONS  (STANDING):  folic acid 1 milliGRAM(s) Oral daily  LORazepam   Injectable 2 milliGRAM(s) IV Push every 4 hours  multivitamin 1 Tablet(s) Oral daily  nicotine - 21 mG/24Hr(s) Patch 1 patch Transdermal daily  thiamine 100 milliGRAM(s) Oral daily

## 2022-02-04 NOTE — PATIENT PROFILE ADULT - FALL HARM RISK - UNIVERSAL INTERVENTIONS
Bed in lowest position, wheels locked, appropriate side rails in place/Call bell, personal items and telephone in reach/Instruct patient to call for assistance before getting out of bed or chair/Non-slip footwear when patient is out of bed/[place] to call system/Physically safe environment - no spills, clutter or unnecessary equipment/Purposeful Proactive Rounding/Room/bathroom lighting operational, light cord in reach

## 2022-02-05 LAB
ANION GAP SERPL CALC-SCNC: 6 MMOL/L — SIGNIFICANT CHANGE UP (ref 5–17)
BILIRUB SERPL-MCNC: 3.4 MG/DL — HIGH (ref 0.2–1.2)
BUN SERPL-MCNC: 4 MG/DL — LOW (ref 7–23)
CALCIUM SERPL-MCNC: 7.9 MG/DL — LOW (ref 8.5–10.1)
CHLORIDE SERPL-SCNC: 109 MMOL/L — HIGH (ref 96–108)
CO2 SERPL-SCNC: 26 MMOL/L — SIGNIFICANT CHANGE UP (ref 22–31)
CREAT SERPL-MCNC: 0.52 MG/DL — SIGNIFICANT CHANGE UP (ref 0.5–1.3)
GLUCOSE SERPL-MCNC: 78 MG/DL — SIGNIFICANT CHANGE UP (ref 70–99)
HCT VFR BLD CALC: 32 % — LOW (ref 34.5–45)
HGB BLD-MCNC: 11.1 G/DL — LOW (ref 11.5–15.5)
INR BLD: 1.31 RATIO — HIGH (ref 0.88–1.16)
MAGNESIUM SERPL-MCNC: 1.3 MG/DL — LOW (ref 1.6–2.6)
MCHC RBC-ENTMCNC: 32.5 PG — SIGNIFICANT CHANGE UP (ref 27–34)
MCHC RBC-ENTMCNC: 34.7 GM/DL — SIGNIFICANT CHANGE UP (ref 32–36)
MCV RBC AUTO: 93.6 FL — SIGNIFICANT CHANGE UP (ref 80–100)
MELD SCORE WITH DIALYSIS: 27 POINTS — SIGNIFICANT CHANGE UP
MELD SCORE WITHOUT DIALYSIS: 14 POINTS — SIGNIFICANT CHANGE UP
NRBC # BLD: 0 /100 WBCS — SIGNIFICANT CHANGE UP (ref 0–0)
PHOSPHATE SERPL-MCNC: 2.6 MG/DL — SIGNIFICANT CHANGE UP (ref 2.5–4.5)
PLATELET # BLD AUTO: 49 K/UL — LOW (ref 150–400)
POTASSIUM SERPL-MCNC: 3.2 MMOL/L — LOW (ref 3.5–5.3)
POTASSIUM SERPL-SCNC: 3.2 MMOL/L — LOW (ref 3.5–5.3)
PROTHROM AB SERPL-ACNC: 15.1 SEC — HIGH (ref 10.6–13.6)
RBC # BLD: 3.42 M/UL — LOW (ref 3.8–5.2)
RBC # FLD: 14 % — SIGNIFICANT CHANGE UP (ref 10.3–14.5)
SODIUM SERPL-SCNC: 141 MMOL/L — SIGNIFICANT CHANGE UP (ref 135–145)
WBC # BLD: 1.22 K/UL — LOW (ref 3.8–10.5)
WBC # FLD AUTO: 1.22 K/UL — LOW (ref 3.8–10.5)

## 2022-02-05 PROCEDURE — 99232 SBSQ HOSP IP/OBS MODERATE 35: CPT

## 2022-02-05 RX ORDER — SODIUM CHLORIDE 9 MG/ML
1000 INJECTION INTRAMUSCULAR; INTRAVENOUS; SUBCUTANEOUS
Refills: 0 | Status: DISCONTINUED | OUTPATIENT
Start: 2022-02-05 | End: 2022-02-06

## 2022-02-05 RX ORDER — SODIUM CHLORIDE 9 MG/ML
1000 INJECTION INTRAMUSCULAR; INTRAVENOUS; SUBCUTANEOUS
Refills: 0 | Status: DISCONTINUED | OUTPATIENT
Start: 2022-02-05 | End: 2022-02-05

## 2022-02-05 RX ADMIN — PANTOPRAZOLE SODIUM 40 MILLIGRAM(S): 20 TABLET, DELAYED RELEASE ORAL at 11:58

## 2022-02-05 RX ADMIN — Medication 2 MILLIGRAM(S): at 05:06

## 2022-02-05 RX ADMIN — Medication 1 PATCH: at 19:30

## 2022-02-05 RX ADMIN — Medication 2 MILLIGRAM(S): at 21:03

## 2022-02-05 RX ADMIN — METHADONE HYDROCHLORIDE 5 MILLIGRAM(S): 40 TABLET ORAL at 08:23

## 2022-02-05 RX ADMIN — Medication 2 MILLIGRAM(S): at 09:13

## 2022-02-05 RX ADMIN — Medication 25 MILLIGRAM(S): at 05:07

## 2022-02-05 RX ADMIN — Medication 2 MILLIGRAM(S): at 13:08

## 2022-02-05 RX ADMIN — Medication 1 PATCH: at 07:00

## 2022-02-05 RX ADMIN — Medication 1 PATCH: at 11:54

## 2022-02-05 RX ADMIN — METHADONE HYDROCHLORIDE 5 MILLIGRAM(S): 40 TABLET ORAL at 19:45

## 2022-02-05 RX ADMIN — Medication 100 MILLIGRAM(S): at 11:58

## 2022-02-05 RX ADMIN — Medication 2 MILLIGRAM(S): at 01:51

## 2022-02-05 RX ADMIN — Medication 1 TABLET(S): at 11:58

## 2022-02-05 RX ADMIN — Medication 2 MILLIGRAM(S): at 17:09

## 2022-02-05 RX ADMIN — Medication 1 PATCH: at 11:57

## 2022-02-05 RX ADMIN — Medication 1 MILLIGRAM(S): at 11:57

## 2022-02-05 RX ADMIN — Medication 25 MILLIGRAM(S): at 17:08

## 2022-02-05 RX ADMIN — SODIUM CHLORIDE 75 MILLILITER(S): 9 INJECTION INTRAMUSCULAR; INTRAVENOUS; SUBCUTANEOUS at 15:14

## 2022-02-05 NOTE — CHART NOTE - NSCHARTNOTEFT_GEN_A_CORE
Called by RN for patient withdrawal. Patient seen and examined at bedside. Patient stating that she feels uncomfortable and in withdrawal. Patient denies chest pain, sob.    CIWA Score    N/V: mild nausea +1  Tremor: mild tremor +2  Paroxysmal Sweats: 0  Anxiety: moderate anxiety +4  Agitation: 0  Tactile Disturbance: +2  Visual Disturbance: 0  Auditory Disturbance: 0  Headache: +2  Orientation: 0    Total: 11    T(C): 36.8 (02-04-22 @ 19:40), Max: 36.8 (02-04-22 @ 19:40)  HR: 78 (02-04-22 @ 19:40) (68 - 85)  BP: 152/94 (02-04-22 @ 19:40) (120/60 - 159/99)  RR: 17 (02-04-22 @ 19:40) (16 - 17)  SpO2: 94% (02-04-22 @ 19:40) (92% - 96%)    Physical Exam:  Gen: anxious appearing  HEENT: NCAT  Cardio: regular rate and rhythm, +s1s2, no murmurs, rubs, or gallops  Pulm: CTA b/l, no wheezes, rales or rhonchi  Abdomen: soft, nontender, nondistended, +BS x4 quadrants, no guarding  Extremities: no clubbing, cyanosis or edema, +2 pedal pulses  Neuro: AAOx3  Skin: warm and dry    A/P  - Will ask nurse to administer PRN medications as per the CIWA protocol  - Continue to monitor   - RN to call for any changes

## 2022-02-05 NOTE — PROGRESS NOTE ADULT - PROBLEM SELECTOR PLAN 1
- Patient with diffuse abdominal tenderness, ascites and vomiting x 3-4 months, now with worsening jaundice and reported hematemesis  - CT A/P: Cirrhosis, splenomegaly, and small intra-abdominal ascites. Colonic bowel wall thickening extending from the cecum through the sigmoid colon, which may be related to portal colopathy versus colitis, as seen on prior examination of 1/26/2022.  - US abdomen: Cirrhotic morphology of the liver. Small amount of ascites. Mildly prominent CBD.  - S/p NS x 1L, Zofran 4mg x2, and Protonix 40mg x1 in the ED; cont IVF NS until pt eating po well  - Can consider SBP prophylaxis with Ceftriaxone if signs of hemodynamic instability   - Started on clear liquid diet  - GI Dr. Dunne consulted, care was discussed with GI, for now no plans for abx or paracentesis

## 2022-02-05 NOTE — PROGRESS NOTE ADULT - SUBJECTIVE AND OBJECTIVE BOX
SUBJECTIVE:    No acute events overnight, afebrile, hds.    VITAL SIGNS:    Vital Signs Last 24 Hrs  T(C): 37 (05 Feb 2022 09:10), Max: 37 (05 Feb 2022 05:17)  T(F): 98.6 (05 Feb 2022 09:10), Max: 98.6 (05 Feb 2022 05:17)  HR: 72 (05 Feb 2022 09:10) (68 - 85)  BP: 170/90 (05 Feb 2022 09:10) (130/60 - 173/93)  BP(mean): --  RR: 18 (05 Feb 2022 09:10) (16 - 18)  SpO2: 94% (05 Feb 2022 09:10) (94% - 96%)    PHYSICAL EXAM:     GENERAL: no acute distress  HEENT: NC/AT, EOMI, neck supple, MMM  RESPIRATORY: LCTAB/L, no rhonchi, rales, or wheezing  CARDIOVASCULAR: RRR, no murmurs, gallops, rubs  ABDOMINAL: soft, non-tender, non-distended, positive bowel sounds   EXTREMITIES: no clubbing, cyanosis, or edema  NEUROLOGICAL: alert and oriented x 3, non-focal  SKIN: no rashes or lesions   MUSCULOSKELETAL: no gross joint deformity                          11.1   1.22  )-----------( 49       ( 05 Feb 2022 12:21 )             32.0     02-05    141  |  109<H>  |  4<L>  ----------------------------<  78  3.2<L>   |  26  |  0.52    Ca    7.9<L>      05 Feb 2022 12:21  Phos  3.0     02-04  Mg     1.7     02-04    TPro  x   /  Alb  x   /  TBili  3.4<H>  /  DBili  x   /  AST  x   /  ALT  x   /  AlkPhos  x   02-05      CAPILLARY BLOOD GLUCOSE          MEDICATIONS  (STANDING):  folic acid 1 milliGRAM(s) Oral daily  LORazepam   Injectable 2 milliGRAM(s) IV Push every 4 hours  multivitamin 1 Tablet(s) Oral daily  nicotine - 21 mG/24Hr(s) Patch 1 patch Transdermal daily  pantoprazole  Injectable 40 milliGRAM(s) IV Push daily  sodium chloride 0.9%. 1000 milliLiter(s) (75 mL/Hr) IV Continuous <Continuous>  thiamine 100 milliGRAM(s) Oral daily

## 2022-02-05 NOTE — PROGRESS NOTE ADULT - SUBJECTIVE AND OBJECTIVE BOX
Date/Time Patient Seen:  		  Referring MD:   Data Reviewed	       Patient is a 41y old  Female who presents with a chief complaint of alcohol detoxification, abdominal pain & jaundice (04 Feb 2022 16:31)      Subjective/HPI     PAST MEDICAL & SURGICAL HISTORY:  Hypertension    Alcohol abuse    Drug abuse    Hepatitis C    IVDU (intravenous drug user)    ETOH abuse    Hepatitis C    Cirrhosis    No significant past surgical history    No significant past surgical history    No significant past surgical history  liposuction          Medication list         MEDICATIONS  (STANDING):  folic acid 1 milliGRAM(s) Oral daily  LORazepam   Injectable 2 milliGRAM(s) IV Push every 4 hours  multivitamin 1 Tablet(s) Oral daily  nicotine - 21 mG/24Hr(s) Patch 1 patch Transdermal daily  pantoprazole  Injectable 40 milliGRAM(s) IV Push daily  sodium chloride 0.9%. 1000 milliLiter(s) (75 mL/Hr) IV Continuous <Continuous>  thiamine 100 milliGRAM(s) Oral daily    MEDICATIONS  (PRN):  aluminum hydroxide/magnesium hydroxide/simethicone Suspension 30 milliLiter(s) Oral every 4 hours PRN Dyspepsia  hydrOXYzine hydrochloride 25 milliGRAM(s) Oral two times a day PRN anxiety - opioid w/d symptoms  LORazepam   Injectable 2 milliGRAM(s) IV Push every 2 hours PRN CIWA-Ar score increase by 2 points and a total score of 7 or less  LORazepam   Injectable 2 milliGRAM(s) IV Push every 1 hour PRN CIWA-Ar score 8 or greater  melatonin 3 milliGRAM(s) Oral at bedtime PRN Insomnia  methadone    Tablet 5 milliGRAM(s) Oral two times a day PRN for Opioid w/d symptoms -  nicotine  Polacrilex Gum 4 milliGRAM(s) Oral every 2 hours PRN nicotine cravings  ondansetron Injectable 4 milliGRAM(s) IV Push every 8 hours PRN Nausea and/or Vomiting         Vitals log        ICU Vital Signs Last 24 Hrs  T(C): 36.8 (04 Feb 2022 19:40), Max: 36.8 (04 Feb 2022 19:40)  T(F): 98.3 (04 Feb 2022 19:40), Max: 98.3 (04 Feb 2022 19:40)  HR: 78 (04 Feb 2022 19:40) (68 - 85)  BP: 152/94 (04 Feb 2022 19:40) (120/60 - 159/99)  BP(mean): --  ABP: --  ABP(mean): --  RR: 17 (04 Feb 2022 19:40) (16 - 17)  SpO2: 94% (04 Feb 2022 19:40) (92% - 96%)           Input and Output:  I&O's Detail    04 Feb 2022 07:01  -  05 Feb 2022 05:11  --------------------------------------------------------  IN:    sodium chloride 0.9%: 750 mL  Total IN: 750 mL    OUT:  Total OUT: 0 mL    Total NET: 750 mL          Lab Data                        10.5   1.48  )-----------( 51       ( 04 Feb 2022 05:07 )             30.8     02-04    146<H>  |  115<H>  |  4<L>  ----------------------------<  79  3.4<L>   |  25  |  0.51    Ca    7.5<L>      04 Feb 2022 05:07  Phos  3.0     02-04  Mg     1.7     02-04    TPro  6.0  /  Alb  1.9<L>  /  TBili  1.6<H>  /  DBili  1.3<H>  /  AST  162<H>  /  ALT  56  /  AlkPhos  212<H>  02-04            Review of Systems	      Objective     Physical Examination    on RA  heart s1s2  lung dec BS      Pertinent Lab findings & Imaging      Galilea:  NO   Adequate UO     I&O's Detail    04 Feb 2022 07:01  -  05 Feb 2022 05:11  --------------------------------------------------------  IN:    sodium chloride 0.9%: 750 mL  Total IN: 750 mL    OUT:  Total OUT: 0 mL    Total NET: 750 mL               Discussed with:     Cultures:	        Radiology

## 2022-02-05 NOTE — PROGRESS NOTE ADULT - PROBLEM SELECTOR PLAN 2
Polysubstance use - EtOH use disorder and substance abuse disorder (w/ opioids)  Alcohol dependence with withdrawal - mild tremor on admission (s/p ativan by ER)  - Admits to consuming 8-10 32oz malt liquor/day  - Current smoker, 1-1.5ppd for 27 years; nicotine patch and gum prn  - History of heroin use - was on methadone, relapsed and states uses 2 bags at a time q3hrs  - She would like to be placed back on methadone/be referred to the clinic after detox; social work and addiction medicine following  - CIWA protocol in place with symptom triggered ativan and ATC ativan for now  - Started on folic acid, thiamine and multivitamin   - Patient requesting for outpatient or inpatient detox - consult social work  - Addiction Medicine Dr. Miller, as per d/w Dr. Miller , while pt wanted higher dose of methadone, for now decision made to kept on 5 bid, will plan to arrange for bridge into outpt methadone clinic

## 2022-02-05 NOTE — PROGRESS NOTE ADULT - SUBJECTIVE AND OBJECTIVE BOX
[facility]  Jamie Hitchcock PA-C  [facility] [place] [address]  [phone]    INTERVAL HPI/OVERNIGHT EVENTS:  pt seen and examined   denies n/v at this time   still having clarissa abd pain       MEDICATIONS  (STANDING):  folic acid 1 milliGRAM(s) Oral daily  LORazepam   Injectable 2 milliGRAM(s) IV Push every 4 hours  multivitamin 1 Tablet(s) Oral daily  nicotine - 21 mG/24Hr(s) Patch 1 patch Transdermal daily  pantoprazole  Injectable 40 milliGRAM(s) IV Push daily  sodium chloride 0.9%. 1000 milliLiter(s) (75 mL/Hr) IV Continuous <Continuous>  thiamine 100 milliGRAM(s) Oral daily    MEDICATIONS  (PRN):  aluminum hydroxide/magnesium hydroxide/simethicone Suspension 30 milliLiter(s) Oral every 4 hours PRN Dyspepsia  hydrOXYzine hydrochloride 25 milliGRAM(s) Oral two times a day PRN anxiety - opioid w/d symptoms  LORazepam   Injectable 2 milliGRAM(s) IV Push every 2 hours PRN CIWA-Ar score increase by 2 points and a total score of 7 or less  LORazepam   Injectable 2 milliGRAM(s) IV Push every 1 hour PRN CIWA-Ar score 8 or greater  melatonin 3 milliGRAM(s) Oral at bedtime PRN Insomnia  methadone    Tablet 5 milliGRAM(s) Oral two times a day PRN for Opioid w/d symptoms -  nicotine  Polacrilex Gum 4 milliGRAM(s) Oral every 2 hours PRN nicotine cravings  ondansetron Injectable 4 milliGRAM(s) IV Push every 8 hours PRN Nausea and/or Vomiting      Allergies  Allergy Status Unknown  No Known Allergies    Intolerances        ROS:   General:  No wt loss, fevers, chills, night sweats, fatigue,   Eyes:  Good vision, no reported pain  ENT:  No sore throat, pain, runny nose, dysphagia  CV:  No pain, palpitations, hypo/hypertension  Resp:  No dyspnea, cough, tachypnea, wheezing  GI:  +pain, No nausea, +intermittent vomiting, No diarrhea, No constipation, No weight loss, No fever, No pruritis, No rectal bleeding, No tarry stools, No dysphagia,  :  No pain, bleeding, incontinence, nocturia  Muscle:  No pain, weakness  Neuro:  No weakness, tingling, memory problems  Psych:  No fatigue, insomnia, mood problems, depression  Endocrine:  No polyuria, polydipsia, cold/heat intolerance  Heme:  No petechiae, ecchymosis, easy bruisability  Skin:  No rash, tattoos, scars, edema          PHYSICAL EXAM:   Vital Signs:  Vital Signs Last 24 Hrs  T(C): 37 (2022 09:10), Max: 37 (2022 05:17)  T(F): 98.6 (2022 09:10), Max: 98.6 (2022 05:17)  HR: 72 (2022 09:10) (68 - 85)  BP: 170/90 (2022 09:10) (120/60 - 173/93)  BP(mean): --  RR: 18 (2022 09:10) (16 - 18)  SpO2: 94% (2022 09:10) (94% - 96%)  Daily     Daily     GENERAL:  Appears stated age  HEENT:  +slightly icteric sclera  CHEST:  Full & symmetric excursion,   HEART:  Regular rhythm  ABDOMEN:  Soft, +tender to deep palpation diffusely, +mildly distended  EXTREMITIES  no cyanosis, clubbing  SKIN:  No rash  NEURO:  Alert    LABS:                        10.5   1.48  )-----------( 51       ( 2022 05:07 )             30.8     02-04    146<H>  |  115<H>  |  4<L>  ----------------------------<  79  3.4<L>   |  25  |  0.51    Ca    7.5<L>      2022 05:07  Phos  3.0     02-04  Mg     1.7     02-04    TPro  6.0  /  Alb  1.9<L>  /  TBili  1.6<H>  /  DBili  1.3<H>  /  AST  162<H>  /  ALT  56  /  AlkPhos  212<H>  02-04    PT/INR - ( 2022 19:54 )   PT: 15.1 sec;   INR: 1.31 ratio    PTT - ( 2022 19:54 )  PTT:43.3 sec  Urinalysis Basic - ( 2022 22:19 )    Color: Yellow / Appearance: Clear / S.010 / pH: x  Gluc: x / Ketone: Negative  / Bili: Negative / Urobili: 4   Blood: x / Protein: Negative / Nitrite: Negative   Leuk Esterase: Negative / RBC: x / WBC x   Sq Epi: x / Non Sq Epi: x / Bacteria: x      Amylase Serum--      Lipase [id number]     Ammonia--      Imaging:  < from: US Abdomen Upper Quadrant Right (22 @ 18:26) >    ACC: [id number] EXAM:  US ABDOMEN RT UPR QUADRANT                          PROCEDURE DATE:  2022          INTERPRETATION:  CLINICAL INFORMATION: Right-sided abdominal pain with   history of alcohol abuse    COMPARISON: Ultrasound of 2019 and CAT scan of 2022    TECHNIQUE: Sonography of the right upper quadrant.    FINDINGS:    Liver: Nodular contour consistent with cirrhosis.  Bile ducts: Normal caliber. Common bile duct measures 7 mm.  Gallbladder: Partly contracted without gross stones.  Pancreas: Visualized portions are within normal limits.  Right kidney: 10.7 cm. No hydronephrosis.  Ascites: Small amount.  IVC: Visualized portions are within normal limits.    IMPRESSION:    Cirrhotic morphology of the liver.  Small amount of ascites  Mildly prominent CBD.    --- End of Report ---    KAYLA CAZARES MD; Attending Radiologist  This document has been electronically signed. Feb  3 2022  6:31PM    < end of copied text >      < from: CT Abdomen and Pelvis w/ IV Cont (22 @ 21:13) >    ACC: [id number] EXAM:  CT ABDOMEN AND PELVIS IC                        ACC: [id number] EXAM:  CT CHEST IC                          PROCEDURE DATE:  2022          INTERPRETATION:  CLINICAL INFORMATION: Vomiting blood.    COMPARISON: None.    CONTRAST/COMPLICATIONS:  IV Contrast: IV contrast documented in associated exam (accession   [id number]), Omnipaque 350 (accession [id number])  90 cc administered   10 cc   discarded  Oral Contrast: NONE  Complications: None reported at time of study completion    PROCEDURE:  CT of the Chest, Abdomen and Pelvis was performed.  Sagittal and coronal reformats were performed.    FINDINGS:  CHEST:  LUNGS AND LARGE AIRWAYS: Patent central airways. No pulmonary nodules.  PLEURA: No pleural effusion.  VESSELS: Within normal limits.  HEART: Heart size is normal. No pericardial effusion.  MEDIASTINUM AND J LUIS: No lymphadenopathy.  CHEST WALL AND LOWER NECK: Within normal limits.    ABDOMEN AND PELVIS:  LIVER: Cirrhosis.  BILE DUCTS: Normal caliber.  GALLBLADDER: Contracted, with nonspecific wall thickening, as on prior.  SPLEEN: Splenomegaly.  PANCREAS: Within normal limits.  ADRENALS: Within normal limits.  KIDNEYS/URETERS: Within normal limits.    BLADDER: Within normal limits.  REPRODUCTIVE ORGANS: Uterus and adnexa within normal limits.    BOWEL: No bowel obstruction. Colonic bowel wall thickening extending from   the cecum through the sigmoid colon, most prominent proximally. Appendix   is normal.  PERITONEUM: Small ascites.  VESSELS: Within normallimits. Patent portal, splenic, and superior   mesenteric veins.  RETROPERITONEUM/LYMPH NODES: No lymphadenopathy.  ABDOMINAL WALL: Anasarca.  BONES: Within normal limits.    IMPRESSION:    No acute intrathoracic pathology on CT.    Cirrhosis, splenomegaly, and small intra-abdominal ascites.    Colonic bowel wall thickening extending from the cecum through the   sigmoid colon, which may be related to portal colopathy versus colitis,   as seen on prior examination of 2022.        --- End of Report ---            BRENNEN MADISON MD; Attending Radiologist  This document has been electronically signed. Feb  3 2022  9:45PM    < end of copied text >  < from: CT Abdomen and Pelvis w/ IV Cont (22 @ 21:13) >    ACC: [id number] EXAM:  CT ABDOMEN AND PELVIS IC                        ACC: [id number] EXAM:  CT CHEST IC                          PROCEDURE DATE:  2022          INTERPRETATION:  CLINICAL INFORMATION: Vomiting blood.    COMPARISON: None.    CONTRAST/COMPLICATIONS:  IV Contrast: IV contrast documented in associated exam (accession   [id number]), Omnipaque 350 (accession [id number])  90 cc administered   10 cc   discarded  Oral Contrast: NONE  Complications: None reported at time of study completion    PROCEDURE:  CT of the Chest, Abdomen and Pelvis was performed.  Sagittal and coronal reformats were performed.    FINDINGS:  CHEST:  LUNGS AND LARGE AIRWAYS: Patent central airways. No pulmonary nodules.  PLEURA: No pleural effusion.  VESSELS: Within normal limits.  HEART: Heart size is normal. No pericardial effusion.  MEDIASTINUM AND J LUIS: No lymphadenopathy.  CHEST WALL AND LOWER NECK: Within normal limits.    ABDOMEN AND PELVIS:  LIVER: Cirrhosis.  BILE DUCTS: Normal caliber.  GALLBLADDER: Contracted, with nonspecific wall thickening, as on prior.  SPLEEN: Splenomegaly.  PANCREAS: Within normal limits.  ADRENALS: Within normal limits.  KIDNEYS/URETERS: Within normal limits.    BLADDER: Within normal limits.  REPRODUCTIVE ORGANS: Uterus and adnexa within normal limits.    BOWEL: No bowel obstruction. Colonic bowel wall thickening extending from   the cecum through the sigmoid colon, most prominent proximally. Appendix   is normal.  PERITONEUM: Small ascites.  VESSELS: Within normallimits. Patent portal, splenic, and superior   mesenteric veins.  RETROPERITONEUM/LYMPH NODES: No lymphadenopathy.  ABDOMINAL WALL: Anasarca.  BONES: Within normal limits.    IMPRESSION:    No acute intrathoracic pathology on CT.    Cirrhosis, splenomegaly, and small intra-abdominal ascites.    Colonic bowel wall thickening extending from the cecum through the   sigmoid colon, which may be related to portal colopathy versus colitis,   as seen on prior examination of 2022.    --- End of Report ---      BRENNEN MADISON MD; Attending Radiologist  This document has been electronically signed. Feb  3 2022  9:45PM    < end of copied text >     [facility]  Jamie Hitchcock PA-C  [facility] [place] [address]  [phone]    INTERVAL HPI/OVERNIGHT EVENTS:  pt seen and examined   denies n/v at this time   still having clarissa abd pain   CIWA score 1-2 this am       MEDICATIONS  (STANDING):  folic acid 1 milliGRAM(s) Oral daily  LORazepam   Injectable 2 milliGRAM(s) IV Push every 4 hours  multivitamin 1 Tablet(s) Oral daily  nicotine - 21 mG/24Hr(s) Patch 1 patch Transdermal daily  pantoprazole  Injectable 40 milliGRAM(s) IV Push daily  sodium chloride 0.9%. 1000 milliLiter(s) (75 mL/Hr) IV Continuous <Continuous>  thiamine 100 milliGRAM(s) Oral daily    MEDICATIONS  (PRN):  aluminum hydroxide/magnesium hydroxide/simethicone Suspension 30 milliLiter(s) Oral every 4 hours PRN Dyspepsia  hydrOXYzine hydrochloride 25 milliGRAM(s) Oral two times a day PRN anxiety - opioid w/d symptoms  LORazepam   Injectable 2 milliGRAM(s) IV Push every 2 hours PRN CIWA-Ar score increase by 2 points and a total score of 7 or less  LORazepam   Injectable 2 milliGRAM(s) IV Push every 1 hour PRN CIWA-Ar score 8 or greater  melatonin 3 milliGRAM(s) Oral at bedtime PRN Insomnia  methadone    Tablet 5 milliGRAM(s) Oral two times a day PRN for Opioid w/d symptoms -  nicotine  Polacrilex Gum 4 milliGRAM(s) Oral every 2 hours PRN nicotine cravings  ondansetron Injectable 4 milliGRAM(s) IV Push every 8 hours PRN Nausea and/or Vomiting      Allergies  Allergy Status Unknown  No Known Allergies    Intolerances        ROS:   General:  No wt loss, fevers, chills, night sweats, fatigue,   Eyes:  Good vision, no reported pain  ENT:  No sore throat, pain, runny nose, dysphagia  CV:  No pain, palpitations, hypo/hypertension  Resp:  No dyspnea, cough, tachypnea, wheezing  GI:  +pain, No nausea, +intermittent vomiting, No diarrhea, No constipation, No weight loss, No fever, No pruritis, No rectal bleeding, No tarry stools, No dysphagia,  :  No pain, bleeding, incontinence, nocturia  Muscle:  No pain, weakness  Neuro:  No weakness, tingling, memory problems  Psych:  No fatigue, insomnia, mood problems, depression  Endocrine:  No polyuria, polydipsia, cold/heat intolerance  Heme:  No petechiae, ecchymosis, easy bruisability  Skin:  No rash, tattoos, scars, edema          PHYSICAL EXAM:   Vital Signs:  Vital Signs Last 24 Hrs  T(C): 37 (2022 09:10), Max: 37 (2022 05:17)  T(F): 98.6 (2022 09:10), Max: 98.6 (2022 05:17)  HR: 72 (2022 09:10) (68 - 85)  BP: 170/90 (2022 09:10) (120/60 - 173/93)  BP(mean): --  RR: 18 (2022 09:10) (16 - 18)  SpO2: 94% (2022 09:10) (94% - 96%)  Daily     Daily     GENERAL:  Appears stated age  HEENT:  +slightly icteric sclera  CHEST:  Full & symmetric excursion,   HEART:  Regular rhythm  ABDOMEN:  Soft, +tender to deep palpation diffusely, +mildly distended  EXTREMITIES  no cyanosis, clubbing  SKIN:  No rash  NEURO:  Alert    LABS:                        10.5   1.48  )-----------( 51       ( 2022 05:07 )             30.8     02-04    146<H>  |  115<H>  |  4<L>  ----------------------------<  79  3.4<L>   |  25  |  0.51    Ca    7.5<L>      2022 05:07  Phos  3.0     02-04  Mg     1.7     -04    TPro  6.0  /  Alb  1.9<L>  /  TBili  1.6<H>  /  DBili  1.3<H>  /  AST  162<H>  /  ALT  56  /  AlkPhos  212<H>  -04    PT/INR - ( 2022 19:54 )   PT: 15.1 sec;   INR: 1.31 ratio    PTT - ( 2022 19:54 )  PTT:43.3 sec  Urinalysis Basic - ( 2022 22:19 )    Color: Yellow / Appearance: Clear / S.010 / pH: x  Gluc: x / Ketone: Negative  / Bili: Negative / Urobili: 4   Blood: x / Protein: Negative / Nitrite: Negative   Leuk Esterase: Negative / RBC: x / WBC x   Sq Epi: x / Non Sq Epi: x / Bacteria: x      Amylase Serum--      Lipase [id number]     Ammonia--      Imaging:  < from: US Abdomen Upper Quadrant Right (22 @ 18:26) >    ACC: [id number] EXAM:  US ABDOMEN RT UPR QUADRANT                          PROCEDURE DATE:  2022          INTERPRETATION:  CLINICAL INFORMATION: Right-sided abdominal pain with   history of alcohol abuse    COMPARISON: Ultrasound of 2019 and CAT scan of 2022    TECHNIQUE: Sonography of the right upper quadrant.    FINDINGS:    Liver: Nodular contour consistent with cirrhosis.  Bile ducts: Normal caliber. Common bile duct measures 7 mm.  Gallbladder: Partly contracted without gross stones.  Pancreas: Visualized portions are within normal limits.  Right kidney: 10.7 cm. No hydronephrosis.  Ascites: Small amount.  IVC: Visualized portions are within normal limits.    IMPRESSION:    Cirrhotic morphology of the liver.  Small amount of ascites  Mildly prominent CBD.    --- End of Report ---    KAYLA CAZARES MD; Attending Radiologist  This document has been electronically signed. Feb  3 2022  6:31PM    < end of copied text >      < from: CT Abdomen and Pelvis w/ IV Cont (22 @ 21:13) >    ACC: [id number] EXAM:  CT ABDOMEN AND PELVIS IC                        ACC: [id number] EXAM:  CT CHEST IC                          PROCEDURE DATE:  2022          INTERPRETATION:  CLINICAL INFORMATION: Vomiting blood.    COMPARISON: None.    CONTRAST/COMPLICATIONS:  IV Contrast: IV contrast documented in associated exam (accession   [id number]), Omnipaque 350 (accession [id number])  90 cc administered   10 cc   discarded  Oral Contrast: NONE  Complications: None reported at time of study completion    PROCEDURE:  CT of the Chest, Abdomen and Pelvis was performed.  Sagittal and coronal reformats were performed.    FINDINGS:  CHEST:  LUNGS AND LARGE AIRWAYS: Patent central airways. No pulmonary nodules.  PLEURA: No pleural effusion.  VESSELS: Within normal limits.  HEART: Heart size is normal. No pericardial effusion.  MEDIASTINUM AND J LUIS: No lymphadenopathy.  CHEST WALL AND LOWER NECK: Within normal limits.    ABDOMEN AND PELVIS:  LIVER: Cirrhosis.  BILE DUCTS: Normal caliber.  GALLBLADDER: Contracted, with nonspecific wall thickening, as on prior.  SPLEEN: Splenomegaly.  PANCREAS: Within normal limits.  ADRENALS: Within normal limits.  KIDNEYS/URETERS: Within normal limits.    BLADDER: Within normal limits.  REPRODUCTIVE ORGANS: Uterus and adnexa within normal limits.    BOWEL: No bowel obstruction. Colonic bowel wall thickening extending from   the cecum through the sigmoid colon, most prominent proximally. Appendix   is normal.  PERITONEUM: Small ascites.  VESSELS: Within normallimits. Patent portal, splenic, and superior   mesenteric veins.  RETROPERITONEUM/LYMPH NODES: No lymphadenopathy.  ABDOMINAL WALL: Anasarca.  BONES: Within normal limits.    IMPRESSION:    No acute intrathoracic pathology on CT.    Cirrhosis, splenomegaly, and small intra-abdominal ascites.    Colonic bowel wall thickening extending from the cecum through the   sigmoid colon, which may be related to portal colopathy versus colitis,   as seen on prior examination of 2022.        --- End of Report ---            BRENNEN MADISON MD; Attending Radiologist  This document has been electronically signed. Feb  3 2022  9:45PM    < end of copied text >  < from: CT Abdomen and Pelvis w/ IV Cont (22 @ 21:13) >    ACC: [id number] EXAM:  CT ABDOMEN AND PELVIS IC                        ACC: [id number] EXAM:  CT CHEST IC                          PROCEDURE DATE:  2022          INTERPRETATION:  CLINICAL INFORMATION: Vomiting blood.    COMPARISON: None.    CONTRAST/COMPLICATIONS:  IV Contrast: IV contrast documented in associated exam (accession   [id number]), Omnipaque 350 (accession [id number])  90 cc administered   10 cc   discarded  Oral Contrast: NONE  Complications: None reported at time of study completion    PROCEDURE:  CT of the Chest, Abdomen and Pelvis was performed.  Sagittal and coronal reformats were performed.    FINDINGS:  CHEST:  LUNGS AND LARGE AIRWAYS: Patent central airways. No pulmonary nodules.  PLEURA: No pleural effusion.  VESSELS: Within normal limits.  HEART: Heart size is normal. No pericardial effusion.  MEDIASTINUM AND J LUIS: No lymphadenopathy.  CHEST WALL AND LOWER NECK: Within normal limits.    ABDOMEN AND PELVIS:  LIVER: Cirrhosis.  BILE DUCTS: Normal caliber.  GALLBLADDER: Contracted, with nonspecific wall thickening, as on prior.  SPLEEN: Splenomegaly.  PANCREAS: Within normal limits.  ADRENALS: Within normal limits.  KIDNEYS/URETERS: Within normal limits.    BLADDER: Within normal limits.  REPRODUCTIVE ORGANS: Uterus and adnexa within normal limits.    BOWEL: No bowel obstruction. Colonic bowel wall thickening extending from   the cecum through the sigmoid colon, most prominent proximally. Appendix   is normal.  PERITONEUM: Small ascites.  VESSELS: Within normallimits. Patent portal, splenic, and superior   mesenteric veins.  RETROPERITONEUM/LYMPH NODES: No lymphadenopathy.  ABDOMINAL WALL: Anasarca.  BONES: Within normal limits.    IMPRESSION:    No acute intrathoracic pathology on CT.    Cirrhosis, splenomegaly, and small intra-abdominal ascites.    Colonic bowel wall thickening extending from the cecum through the   sigmoid colon, which may be related to portal colopathy versus colitis,   as seen on prior examination of 2022.    --- End of Report ---      BRENNEN MADISON MD; Attending Radiologist  This document has been electronically signed. Feb  3 2022  9:45PM    < end of copied text >

## 2022-02-06 VITALS
SYSTOLIC BLOOD PRESSURE: 140 MMHG | DIASTOLIC BLOOD PRESSURE: 70 MMHG | TEMPERATURE: 99 F | RESPIRATION RATE: 18 BRPM | HEART RATE: 66 BPM | OXYGEN SATURATION: 94 %

## 2022-02-06 LAB
ANION GAP SERPL CALC-SCNC: 6 MMOL/L — SIGNIFICANT CHANGE UP (ref 5–17)
BUN SERPL-MCNC: 5 MG/DL — LOW (ref 7–23)
CALCIUM SERPL-MCNC: 8.2 MG/DL — LOW (ref 8.5–10.1)
CHLORIDE SERPL-SCNC: 111 MMOL/L — HIGH (ref 96–108)
CO2 SERPL-SCNC: 25 MMOL/L — SIGNIFICANT CHANGE UP (ref 22–31)
CREAT SERPL-MCNC: 0.59 MG/DL — SIGNIFICANT CHANGE UP (ref 0.5–1.3)
GLUCOSE SERPL-MCNC: 68 MG/DL — LOW (ref 70–99)
HCT VFR BLD CALC: 32.9 % — LOW (ref 34.5–45)
HGB BLD-MCNC: 11.8 G/DL — SIGNIFICANT CHANGE UP (ref 11.5–15.5)
MAGNESIUM SERPL-MCNC: 1.6 MG/DL — SIGNIFICANT CHANGE UP (ref 1.6–2.6)
MCHC RBC-ENTMCNC: 33 PG — SIGNIFICANT CHANGE UP (ref 27–34)
MCHC RBC-ENTMCNC: 35.9 GM/DL — SIGNIFICANT CHANGE UP (ref 32–36)
MCV RBC AUTO: 91.9 FL — SIGNIFICANT CHANGE UP (ref 80–100)
NRBC # BLD: 0 /100 WBCS — SIGNIFICANT CHANGE UP (ref 0–0)
PHOSPHATE SERPL-MCNC: 2.8 MG/DL — SIGNIFICANT CHANGE UP (ref 2.5–4.5)
PLATELET # BLD AUTO: 57 K/UL — LOW (ref 150–400)
POTASSIUM SERPL-MCNC: 3.3 MMOL/L — LOW (ref 3.5–5.3)
POTASSIUM SERPL-SCNC: 3.3 MMOL/L — LOW (ref 3.5–5.3)
RBC # BLD: 3.58 M/UL — LOW (ref 3.8–5.2)
RBC # FLD: 13.9 % — SIGNIFICANT CHANGE UP (ref 10.3–14.5)
SARS-COV-2 RNA SPEC QL NAA+PROBE: SIGNIFICANT CHANGE UP
SODIUM SERPL-SCNC: 142 MMOL/L — SIGNIFICANT CHANGE UP (ref 135–145)
WBC # BLD: 1.68 K/UL — LOW (ref 3.8–10.5)
WBC # FLD AUTO: 1.68 K/UL — LOW (ref 3.8–10.5)

## 2022-02-06 PROCEDURE — 85730 THROMBOPLASTIN TIME PARTIAL: CPT

## 2022-02-06 PROCEDURE — 84702 CHORIONIC GONADOTROPIN TEST: CPT

## 2022-02-06 PROCEDURE — 99285 EMERGENCY DEPT VISIT HI MDM: CPT

## 2022-02-06 PROCEDURE — 96375 TX/PRO/DX INJ NEW DRUG ADDON: CPT

## 2022-02-06 PROCEDURE — 80076 HEPATIC FUNCTION PANEL: CPT

## 2022-02-06 PROCEDURE — 99232 SBSQ HOSP IP/OBS MODERATE 35: CPT

## 2022-02-06 PROCEDURE — 87086 URINE CULTURE/COLONY COUNT: CPT

## 2022-02-06 PROCEDURE — 85025 COMPLETE CBC W/AUTO DIFF WBC: CPT

## 2022-02-06 PROCEDURE — 85610 PROTHROMBIN TIME: CPT

## 2022-02-06 PROCEDURE — U0003: CPT

## 2022-02-06 PROCEDURE — 80053 COMPREHEN METABOLIC PANEL: CPT

## 2022-02-06 PROCEDURE — 80307 DRUG TEST PRSMV CHEM ANLYZR: CPT

## 2022-02-06 PROCEDURE — 83690 ASSAY OF LIPASE: CPT

## 2022-02-06 PROCEDURE — 83735 ASSAY OF MAGNESIUM: CPT

## 2022-02-06 PROCEDURE — 87635 SARS-COV-2 COVID-19 AMP PRB: CPT

## 2022-02-06 PROCEDURE — 96374 THER/PROPH/DIAG INJ IV PUSH: CPT

## 2022-02-06 PROCEDURE — 71260 CT THORAX DX C+: CPT | Mod: MA

## 2022-02-06 PROCEDURE — U0005: CPT

## 2022-02-06 PROCEDURE — 71045 X-RAY EXAM CHEST 1 VIEW: CPT

## 2022-02-06 PROCEDURE — 76705 ECHO EXAM OF ABDOMEN: CPT

## 2022-02-06 PROCEDURE — 84100 ASSAY OF PHOSPHORUS: CPT

## 2022-02-06 PROCEDURE — 85027 COMPLETE CBC AUTOMATED: CPT

## 2022-02-06 PROCEDURE — 93005 ELECTROCARDIOGRAM TRACING: CPT

## 2022-02-06 PROCEDURE — 74177 CT ABD & PELVIS W/CONTRAST: CPT | Mod: MA

## 2022-02-06 PROCEDURE — 93970 EXTREMITY STUDY: CPT

## 2022-02-06 PROCEDURE — 80048 BASIC METABOLIC PNL TOTAL CA: CPT

## 2022-02-06 PROCEDURE — 36415 COLL VENOUS BLD VENIPUNCTURE: CPT

## 2022-02-06 PROCEDURE — 81003 URINALYSIS AUTO W/O SCOPE: CPT

## 2022-02-06 RX ADMIN — PANTOPRAZOLE SODIUM 40 MILLIGRAM(S): 20 TABLET, DELAYED RELEASE ORAL at 11:50

## 2022-02-06 RX ADMIN — Medication 1 PATCH: at 11:49

## 2022-02-06 RX ADMIN — Medication 2 MILLIGRAM(S): at 01:18

## 2022-02-06 RX ADMIN — Medication 1 MILLIGRAM(S): at 11:50

## 2022-02-06 RX ADMIN — Medication 25 MILLIGRAM(S): at 16:51

## 2022-02-06 RX ADMIN — Medication 25 MILLIGRAM(S): at 05:51

## 2022-02-06 RX ADMIN — METHADONE HYDROCHLORIDE 5 MILLIGRAM(S): 40 TABLET ORAL at 12:59

## 2022-02-06 RX ADMIN — Medication 30 MILLILITER(S): at 17:19

## 2022-02-06 RX ADMIN — Medication 25 MILLIGRAM(S): at 13:47

## 2022-02-06 RX ADMIN — Medication 2 MILLIGRAM(S): at 05:31

## 2022-02-06 RX ADMIN — Medication 100 MILLIGRAM(S): at 11:50

## 2022-02-06 RX ADMIN — Medication 1 PATCH: at 07:00

## 2022-02-06 RX ADMIN — Medication 1 PATCH: at 11:47

## 2022-02-06 RX ADMIN — Medication 1 TABLET(S): at 11:50

## 2022-02-06 RX ADMIN — Medication 25 MILLIGRAM(S): at 05:31

## 2022-02-06 RX ADMIN — Medication 1 PATCH: at 18:56

## 2022-02-06 NOTE — DIETITIAN INITIAL EVALUATION ADULT. - PROBLEM SELECTOR PLAN 2
Polysubstance use - EtOH use disorder and substance abuse disorder (w/ opioids)  Alcohol dependence with withdrawal - mild tremor on admission (s/p ativan by ER)  - Admits to consuming 8-10 32oz malt liquor/day  - Current smoker, 1-1.5ppd for 27 years  - History of heroin use - was on methadone, relapsed and states uses 2 bags at a time q3hrs  - She would like to be placed back on methadone/be referred to the clinic after detox  - CIWA protocol in place with symptom triggered ativan and ATC ativan for now  - Started on folic acid, thiamine and multivitamin   - NRT with nicotine patch and gum PRN  - Patient requesting for outpatient or inpatient detox - consult social work  - Addiction Medicine Dr. Miller

## 2022-02-06 NOTE — DIETITIAN NUTRITION RISK NOTIFICATION - TREATMENT: THE FOLLOWING DIET HAS BEEN RECOMMENDED
Diet, Regular:   Low Sodium  Supplement Feeding Modality:  Oral  Ensure Enlive Cans or Servings Per Day:  1       Frequency:  Two Times a day (02-06-22 @ 13:56) [Pending Verification By Attending]  Diet, Regular (02-06-22 @ 12:56) [Active]

## 2022-02-06 NOTE — PROGRESS NOTE ADULT - PROVIDER SPECIALTY LIST ADULT
Addiction Medicine
Hospitalist
Hospitalist
Addiction Medicine
Gastroenterology
Gastroenterology
Hospitalist

## 2022-02-06 NOTE — PROGRESS NOTE ADULT - PROBLEM SELECTOR PLAN 3
Plt on admission 85, likely in setting of hepatic cirrhosis   - monitor for signs of acute bleeding - pt reporting hematemesis  - hold chemical VTE ppx for now - SCDs if US dopplers negative  - f/u AM CBC

## 2022-02-06 NOTE — DIETITIAN INITIAL EVALUATION ADULT. - PROBLEM SELECTOR PLAN 4
DVT ppx: Hold chemical VTE ppx in setting of thrombocytopenia & reported hematemesis  - Order LE doppler for b/l LE swelling & LLE with tautness, r/o DVT vs Baker cyst  - SCDs if dopplers negative

## 2022-02-06 NOTE — PROGRESS NOTE ADULT - SUBJECTIVE AND OBJECTIVE BOX
Date/Time Patient Seen:  		  Referring MD:   Data Reviewed	       Patient is a 41y old  Female who presents with a chief complaint of alcohol detoxification, abdominal pain & jaundice (05 Feb 2022 13:10)      Subjective/HPI     PAST MEDICAL & SURGICAL HISTORY:  Hypertension    Alcohol abuse    Drug abuse    Hepatitis C    IVDU (intravenous drug user)    ETOH abuse    Hepatitis C    Cirrhosis    No significant past surgical history    No significant past surgical history    No significant past surgical history  liposuction          Medication list         MEDICATIONS  (STANDING):  folic acid 1 milliGRAM(s) Oral daily  LORazepam   Injectable 2 milliGRAM(s) IV Push every 4 hours  multivitamin 1 Tablet(s) Oral daily  nicotine - 21 mG/24Hr(s) Patch 1 patch Transdermal daily  pantoprazole  Injectable 40 milliGRAM(s) IV Push daily  sodium chloride 0.9%. 1000 milliLiter(s) (75 mL/Hr) IV Continuous <Continuous>  thiamine 100 milliGRAM(s) Oral daily    MEDICATIONS  (PRN):  aluminum hydroxide/magnesium hydroxide/simethicone Suspension 30 milliLiter(s) Oral every 4 hours PRN Dyspepsia  hydrOXYzine hydrochloride 25 milliGRAM(s) Oral two times a day PRN anxiety - opioid w/d symptoms  LORazepam   Injectable 2 milliGRAM(s) IV Push every 2 hours PRN CIWA-Ar score increase by 2 points and a total score of 7 or less  LORazepam   Injectable 2 milliGRAM(s) IV Push every 1 hour PRN CIWA-Ar score 8 or greater  melatonin 3 milliGRAM(s) Oral at bedtime PRN Insomnia  methadone    Tablet 5 milliGRAM(s) Oral two times a day PRN for Opioid w/d symptoms -  nicotine  Polacrilex Gum 4 milliGRAM(s) Oral every 2 hours PRN nicotine cravings  ondansetron Injectable 4 milliGRAM(s) IV Push every 8 hours PRN Nausea and/or Vomiting         Vitals log        ICU Vital Signs Last 24 Hrs  T(C): 36.9 (06 Feb 2022 05:22), Max: 37 (05 Feb 2022 09:10)  T(F): 98.5 (06 Feb 2022 05:22), Max: 98.6 (05 Feb 2022 09:10)  HR: 75 (06 Feb 2022 05:22) (66 - 75)  BP: 171/91 (06 Feb 2022 05:22) (155/83 - 171/91)  BP(mean): --  ABP: --  ABP(mean): --  RR: 17 (06 Feb 2022 05:22) (17 - 18)  SpO2: 92% (06 Feb 2022 05:22) (92% - 95%)           Input and Output:  I&O's Detail    04 Feb 2022 07:01  -  05 Feb 2022 07:00  --------------------------------------------------------  IN:    sodium chloride 0.9%: 750 mL  Total IN: 750 mL    OUT:  Total OUT: 0 mL    Total NET: 750 mL      05 Feb 2022 07:01  -  06 Feb 2022 05:32  --------------------------------------------------------  IN:    sodium chloride 0.9%: 825 mL  Total IN: 825 mL    OUT:  Total OUT: 0 mL    Total NET: 825 mL          Lab Data                        11.1   1.22  )-----------( 49       ( 05 Feb 2022 12:21 )             32.0     02-05    141  |  109<H>  |  4<L>  ----------------------------<  78  3.2<L>   |  26  |  0.52    Ca    7.9<L>      05 Feb 2022 12:21  Phos  2.6     02-05  Mg     1.3     02-05    TPro  x   /  Alb  x   /  TBili  3.4<H>  /  DBili  x   /  AST  x   /  ALT  x   /  AlkPhos  x   02-05            Review of Systems	      Objective     Physical Examination    heart s1s2  lung dec BS  abd soft      Pertinent Lab findings & Imaging      Galilea:  NO   Adequate UO     I&O's Detail    04 Feb 2022 07:01  -  05 Feb 2022 07:00  --------------------------------------------------------  IN:    sodium chloride 0.9%: 750 mL  Total IN: 750 mL    OUT:  Total OUT: 0 mL    Total NET: 750 mL      05 Feb 2022 07:01  -  06 Feb 2022 05:32  --------------------------------------------------------  IN:    sodium chloride 0.9%: 825 mL  Total IN: 825 mL    OUT:  Total OUT: 0 mL    Total NET: 825 mL               Discussed with:     Cultures:	        Radiology

## 2022-02-06 NOTE — PROGRESS NOTE ADULT - SUBJECTIVE AND OBJECTIVE BOX
INTERVAL HPI/OVERNIGHT EVENTS:  pt seen and examined  reports not feeling well from detox  Abdominal pain in epigastric and LUQ  still has some nausea but improving and no  vomiting    MEDICATIONS  (STANDING):  chlordiazePOXIDE 25 milliGRAM(s) Oral every 8 hours  folic acid 1 milliGRAM(s) Oral daily  multivitamin 1 Tablet(s) Oral daily  nicotine - 21 mG/24Hr(s) Patch 1 patch Transdermal daily  pantoprazole  Injectable 40 milliGRAM(s) IV Push daily  sodium chloride 0.9%. 1000 milliLiter(s) (75 mL/Hr) IV Continuous <Continuous>  thiamine 100 milliGRAM(s) Oral daily    MEDICATIONS  (PRN):  aluminum hydroxide/magnesium hydroxide/simethicone Suspension 30 milliLiter(s) Oral every 4 hours PRN Dyspepsia  hydrOXYzine hydrochloride 25 milliGRAM(s) Oral two times a day PRN anxiety - opioid w/d symptoms  LORazepam   Injectable 2 milliGRAM(s) IV Push every 2 hours PRN CIWA-Ar score increase by 2 points and a total score of 7 or less  LORazepam   Injectable 2 milliGRAM(s) IV Push every 1 hour PRN CIWA-Ar score 8 or greater  melatonin 3 milliGRAM(s) Oral at bedtime PRN Insomnia  methadone    Tablet 5 milliGRAM(s) Oral two times a day PRN for Opioid w/d symptoms -  nicotine  Polacrilex Gum 4 milliGRAM(s) Oral every 2 hours PRN nicotine cravings  ondansetron Injectable 4 milliGRAM(s) IV Push every 8 hours PRN Nausea and/or Vomiting      Allergies  Allergy Status Unknown  No Known Allergies    Intolerances        ROS:   General:  No wt loss, fevers, chills, night sweats, fatigue,   Eyes:  Good vision, no reported pain  ENT:  No sore throat, pain, runny nose, dysphagia  CV:  No pain, palpitations, hypo/hypertension  Resp:  No dyspnea, cough, tachypnea, wheezing  GI:  + pain, +nausea, No vomiting, No diarrhea, No constipation  :  No pain, bleeding, incontinence, nocturia  Muscle:  No pain, weakness  Neuro:  No weakness, tingling, memory problems  Psych:  No fatigue, insomnia, mood problems, depression  Endocrine:  No polyuria, polydipsia, cold/heat intolerance  Heme:  No petechiae, ecchymosis, easy bruisability  Skin:  No rash, tattoos, scars, edema      PHYSICAL EXAM:   Vital Signs:  Vital Signs Last 24 Hrs  T(C): 36.9 (06 Feb 2022 05:22), Max: 36.9 (05 Feb 2022 20:15)  T(F): 98.5 (06 Feb 2022 05:22), Max: 98.5 (05 Feb 2022 20:15)  HR: 75 (06 Feb 2022 05:22) (66 - 75)  BP: 171/91 (06 Feb 2022 05:22) (155/83 - 171/91)  BP(mean): --  RR: 17 (06 Feb 2022 05:22) (17 - 18)  SpO2: 92% (06 Feb 2022 05:22) (92% - 95%)  Daily     Daily     GENERAL:  Appears stated age, well-groomed, well-nourished, no distress  HEENT:  NC/AT,  conjunctivae clear and pink, no thyromegaly, nodules, adenopathy, no JVD, sclera -anicteric  CHEST:  Full & symmetric excursion, no increased effort, breath sounds clear  HEART:  Regular rhythm, S1, S2, no murmur/rub/S3/S4, no abdominal bruit, no edema  ABDOMEN:  Soft, LUQ tender to touch , non-distended, normoactive bowel sounds,    EXTEREMITIES:  no cyanosis,clubbing or edema  SKIN:  No rash/erythema/ecchymoses/petechiae/wounds/abscess/warm/dry  NEURO:  Alert, oriented, no asterixis, no tremor, no encephalopathy      LABS:                        11.8   1.68  )-----------( 57       ( 06 Feb 2022 07:58 )             32.9     02-06    142  |  111<H>  |  5<L>  ----------------------------<  68<L>  3.3<L>   |  25  |  0.59    Ca    8.2<L>      06 Feb 2022 07:58  Phos  2.8     02-06  Mg     1.6     02-06    TPro  x   /  Alb  x   /  TBili  3.4<H>  /  DBili  x   /  AST  x   /  ALT  x   /  AlkPhos  x   02-05    PT/INR - ( 05 Feb 2022 12:21 )   PT: 15.1 sec;   INR: 1.31 ratio               RADIOLOGY & ADDITIONAL TESTS:   INTERVAL HPI/OVERNIGHT EVENTS:  pt seen and examined  reports not feeling well from detox  Abdominal pain in epigastric and LUQ  still has some nausea but improving and no  vomiting    MEDICATIONS  (STANDING):  chlordiazePOXIDE 25 milliGRAM(s) Oral every 8 hours  folic acid 1 milliGRAM(s) Oral daily  multivitamin 1 Tablet(s) Oral daily  nicotine - 21 mG/24Hr(s) Patch 1 patch Transdermal daily  pantoprazole  Injectable 40 milliGRAM(s) IV Push daily  sodium chloride 0.9%. 1000 milliLiter(s) (75 mL/Hr) IV Continuous <Continuous>  thiamine 100 milliGRAM(s) Oral daily    MEDICATIONS  (PRN):  aluminum hydroxide/magnesium hydroxide/simethicone Suspension 30 milliLiter(s) Oral every 4 hours PRN Dyspepsia  hydrOXYzine hydrochloride 25 milliGRAM(s) Oral two times a day PRN anxiety - opioid w/d symptoms  LORazepam   Injectable 2 milliGRAM(s) IV Push every 2 hours PRN CIWA-Ar score increase by 2 points and a total score of 7 or less  LORazepam   Injectable 2 milliGRAM(s) IV Push every 1 hour PRN CIWA-Ar score 8 or greater  melatonin 3 milliGRAM(s) Oral at bedtime PRN Insomnia  methadone    Tablet 5 milliGRAM(s) Oral two times a day PRN for Opioid w/d symptoms -  nicotine  Polacrilex Gum 4 milliGRAM(s) Oral every 2 hours PRN nicotine cravings  ondansetron Injectable 4 milliGRAM(s) IV Push every 8 hours PRN Nausea and/or Vomiting      Allergies  Allergy Status Unknown  No Known Allergies    Intolerances        ROS:   General:  No wt loss, fevers, chills, night sweats, fatigue,   Eyes:  Good vision, no reported pain  ENT:  No sore throat, pain, runny nose, dysphagia  CV:  No pain, palpitations, hypo/hypertension  Resp:  No dyspnea, cough, tachypnea, wheezing  GI:  + pain, +nausea, No vomiting, No diarrhea, No constipation  :  No pain, bleeding, incontinence, nocturia  Muscle:  No pain, weakness  Neuro:  No weakness, tingling, memory problems  Psych:  No fatigue, insomnia, mood problems, depression  Endocrine:  No polyuria, polydipsia, cold/heat intolerance  Heme:  No petechiae, ecchymosis, easy bruisability  Skin:  No rash, tattoos, scars, edema      PHYSICAL EXAM:   Vital Signs:  Vital Signs Last 24 Hrs  T(C): 36.9 (06 Feb 2022 05:22), Max: 36.9 (05 Feb 2022 20:15)  T(F): 98.5 (06 Feb 2022 05:22), Max: 98.5 (05 Feb 2022 20:15)  HR: 75 (06 Feb 2022 05:22) (66 - 75)  BP: 171/91 (06 Feb 2022 05:22) (155/83 - 171/91)  BP(mean): --  RR: 17 (06 Feb 2022 05:22) (17 - 18)  SpO2: 92% (06 Feb 2022 05:22) (92% - 95%)  Daily     Daily     GENERAL:  Appears stated age, well-groomed, well-nourished, no distress  HEENT:  NC/AT,  conjunctivae clear and pink, no thyromegaly, nodules, adenopathy, no JVD, sclera -anicteric  CHEST:  Full & symmetric excursion, no increased effort, breath sounds clear  HEART:  Regular rhythm, S1, S2, no murmur/rub/S3/S4, no abdominal bruit, no edema  ABDOMEN:  Soft, LUQ tender to touch , non-distended, normoactive bowel sounds,    EXTEREMITIES:  no cyanosis,clubbing, BL UE +2 edema  SKIN:  No rash/erythema/ecchymoses/petechiae/wounds/abscess/warm/dry  NEURO:  Alert, oriented, no asterixis, no tremor, no encephalopathy      LABS:                        11.8   1.68  )-----------( 57       ( 06 Feb 2022 07:58 )             32.9     02-06    142  |  111<H>  |  5<L>  ----------------------------<  68<L>  3.3<L>   |  25  |  0.59    Ca    8.2<L>      06 Feb 2022 07:58  Phos  2.8     02-06  Mg     1.6     02-06    TPro  x   /  Alb  x   /  TBili  3.4<H>  /  DBili  x   /  AST  x   /  ALT  x   /  AlkPhos  x   02-05    PT/INR - ( 05 Feb 2022 12:21 )   PT: 15.1 sec;   INR: 1.31 ratio               RADIOLOGY & ADDITIONAL TESTS:

## 2022-02-06 NOTE — DIETITIAN INITIAL EVALUATION ADULT. - PROBLEM SELECTOR PLAN 1
- Patient with diffuse abdominal tenderness, ascites and vomiting x 3-4 months, now with worsening jaundice and reported hematemesis  - Admit to F  - CT A/P: Cirrhosis, splenomegaly, and small intra-abdominal ascites. Colonic bowel wall thickening extending from the cecum through the sigmoid colon, which may be related to portal colopathy versus colitis, as seen on prior examination of 1/26/2022.  - US abdomen: Cirrhotic morphology of the liver. Small amount of ascites. Mildly prominent CBD.  - S/p NS x 1L, Zofran 4mg x2, and Protonix 40mg x1 in the ED  - Can consider SBP prophylaxis with Ceftriaxone if signs of hemodynamic instability   - Started on clear liquid diet  - GI Dr. Dunne consulted, f/u reccs

## 2022-02-06 NOTE — DIETITIAN INITIAL EVALUATION ADULT. - MALNUTRITION
Moderate protein calorie malnutrition in context of acute on chronic illness Moderate protein calorie malnutrition in context of acute illness

## 2022-02-06 NOTE — PROGRESS NOTE ADULT - PROBLEM SELECTOR PLAN 1
- Patient with diffuse abdominal tenderness, ascites and vomiting x 3-4 months, now with worsening jaundice and reported hematemesis  - CT A/P: Cirrhosis, splenomegaly, and small intra-abdominal ascites. Colonic bowel wall thickening extending from the cecum through the sigmoid colon, which may be related to portal colopathy versus colitis, as seen on prior examination of 1/26/2022.  - US abdomen: Cirrhotic morphology of the liver. Small amount of ascites. Mildly prominent CBD.  - S/p NS x 1L, Zofran 4mg x2, and Protonix 40mg x1 in the ED; cont IVF NS until pt eating po well  - Can consider SBP prophylaxis with Ceftriaxone if signs of hemodynamic instability   - Started on clear liquid diet, advanced to regular diet; care d/w nutrition, and ensure added as well  - GI Dr. Dunne consulted, care was discussed with GI, for now no plans for abx or paracentesis

## 2022-02-06 NOTE — PROGRESS NOTE ADULT - SUBJECTIVE AND OBJECTIVE BOX
SUBJECTIVE:    No acute events overnight, afebrile, hds.  Pt would like to be advanced to a regular diet.    VITAL SIGNS:    Vital Signs Last 24 Hrs  T(C): 36.9 (06 Feb 2022 12:41), Max: 36.9 (05 Feb 2022 20:15)  T(F): 98.4 (06 Feb 2022 12:41), Max: 98.5 (05 Feb 2022 20:15)  HR: 64 (06 Feb 2022 12:41) (64 - 75)  BP: 121/81 (06 Feb 2022 12:41) (121/81 - 171/91)  BP(mean): --  RR: 18 (06 Feb 2022 12:41) (17 - 18)  SpO2: 93% (06 Feb 2022 12:41) (92% - 95%)    PHYSICAL EXAM:     GENERAL: no acute distress  HEENT: EOMI, neck supple, MMM  RESPIRATORY: LCTAB/L, no rhonchi, rales, or wheezing  CARDIOVASCULAR: RRR, no murmurs, gallops, rubs  ABDOMINAL: soft, non-tender, non-distended, positive bowel sounds   EXTREMITIES: no clubbing, cyanosis, or edema  NEUROLOGICAL: alert and oriented x 3, non-focal  SKIN: no rashes or lesions   MUSCULOSKELETAL: no gross joint deformity                          11.8   1.68  )-----------( 57       ( 06 Feb 2022 07:58 )             32.9     02-06    142  |  111<H>  |  5<L>  ----------------------------<  68<L>  3.3<L>   |  25  |  0.59    Ca    8.2<L>      06 Feb 2022 07:58  Phos  2.8     02-06  Mg     1.6     02-06    TPro  x   /  Alb  x   /  TBili  3.4<H>  /  DBili  x   /  AST  x   /  ALT  x   /  AlkPhos  x   02-05      CAPILLARY BLOOD GLUCOSE          MEDICATIONS  (STANDING):  aluminum hydroxide/magnesium hydroxide/simethicone Suspension 30 milliLiter(s) Oral every 6 hours  chlordiazePOXIDE 25 milliGRAM(s) Oral every 8 hours  folic acid 1 milliGRAM(s) Oral daily  multivitamin 1 Tablet(s) Oral daily  nicotine - 21 mG/24Hr(s) Patch 1 patch Transdermal daily  pantoprazole  Injectable 40 milliGRAM(s) IV Push daily  sodium chloride 0.9%. 1000 milliLiter(s) (75 mL/Hr) IV Continuous <Continuous>  thiamine 100 milliGRAM(s) Oral daily

## 2022-02-06 NOTE — DIETITIAN INITIAL EVALUATION ADULT. - OTHER INFO
41F with PMHx of EtOH use disorder (now relapsed), cirrhosis, IVDU (heroin), HTN p/w abdominal pain admitted for alcohol detoxification and hepatic cirrhosis with reported hematemesis and jaundice.    Pt A+Ox4 during visit. Clear liquid diet past 3 days. Small po intake with reports of N/V/abdominal pain. Improved nausea/abd pain today and diet advanced to regular. Will monitor tolerance. Small loose BM today. Pt reports decreased appetite/po intake with persistnet N/V over the past few weeks; associated ~12lb unintentional weight loss. UBW 162lbs believes current weight ~150lbs. Hx ETOH abuse- on MVI, thiamine, folic acid. Low K 3.3- recommend supplementation. Hypoglycemia noted, BS 62(2/6). No hx DM. Encourage po intake. Pt agreeable to ensure supplement (chocolate) while po intake diminished. Additional food preferences/meal alternatives obtained.

## 2022-02-06 NOTE — PROGRESS NOTE ADULT - REASON FOR ADMISSION
alcohol detoxification, abdominal pain & jaundice

## 2022-02-06 NOTE — PROGRESS NOTE ADULT - ASSESSMENT
Abdominal pain  EtOH use disorder  Hematemesis    CT noted  US noted  Discussed imaging results with patient  Ascites appears too small for paracentesis  Monitor abdominal exam  Pt reports intermittent blood-streaked emesis  Monitor cbc/ LFTs daily  PPI for prophylaxis  monitor for gi bleed  Will follow          I reviewed the overnight course of events on the unit, re-confirming the patient history. I discussed the care with the patient and their family  Differential diagnosis and plan of care discussed with patient after the evaluation  35 minutes spent on total encounter of which more than fifty percent of the encounter was spent counseling and/or coordinating care by the attending physician.  Advanced care planning was discussed with patient and family.  Advanced care planning forms were reviewed and discussed.  Risks, benefits and alternatives of gastroenterologic procedures were discussed in detail and all questions were answered.  
Abdominal pain  EtOH use disorder  Hematemesis    CT noted  US noted  Discussed imaging results with patient  Ascites appears too small for paracentesis  Monitor abdominal exam  Pt reports intermittent blood-streaked emesis  Monitor cbc/ LFTs daily  PPI for prophylaxis  monitor for gi bleed  Will follow          I reviewed the overnight course of events on the unit, re-confirming the patient history. I discussed the care with the patient and their family  Differential diagnosis and plan of care discussed with patient after the evaluation  35 minutes spent on total encounter of which more than fifty percent of the encounter was spent counseling and/or coordinating care by the attending physician.  Advanced care planning was discussed with patient and family.  Advanced care planning forms were reviewed and discussed.  Risks, benefits and alternatives of gastroenterologic procedures were discussed in detail and all questions were answered.  
41F with PMHx of EtOH use disorder (now relapsed), cirrhosis, IVDU (heroin), HTN p/w abdominal pain admitted for alcohol detoxification and hepatic cirrhosis with reported hematemesis and jaundice.    colitis eval  hx of cirrhosis  Etoh use disorder - LUKE eval  Heroin use - Opioid dep - formerly on Methadone via [facility]  Smoker  ADHD    Librium ATC with hold parameters  cont curr dose of Methadone PRN  Atarax PRN for w/d sx  would not change dosing or schedule at present    will change Ativan to Librium  CIWA - prn Ativan  Folic Acid - Thiamine -   Opioid dep - Methadone BID prn for w/d symptoms - use Mylanta - Tylenol - Atarax PRN for sx management - COWS scale for Opioid w/d sx assessment  Social work eval  smoking cess ed  counseling - education - emotional support  NRT  serial LABS - LFTs - Desiree peralta  pt is not working now, lives with Fiance - has children - 
41F with PMHx of EtOH use disorder (now relapsed), cirrhosis, IVDU (heroin), HTN p/w abdominal pain admitted for alcohol detoxification and hepatic cirrhosis with reported hematemesis and jaundice.    colitis eval  hx of cirrhosis  Etoh use disorder - LUKE eval  Heroin use - Opioid dep - formerly on Methadone via [facility]  Smoker  ADHD    w/d sx reported - pt is on round the clock Ativan and Ativan PRN dosing  cont curr dose of Methadone PRN  cont Atarax PRN for w/d sx  would not change dosing or schedule at present    on Ativan Schedule for LUKE - Etoh Use Disorder - BRANDON  CIWA - prn Ativan  Folic Acid - Thiamine -   Opioid dep - Methadone BID prn for w/d symptoms - use Mylanta - Tylenol - Atarax PRN for sx management - COWS scale for Opioid w/d sx assessment  Social work eval  smoking cess ed  counseling - education - emotional support  NRT  serial LABS - LFTs - Desiree peralta  pt is not working now, lives with Fijohana - has children - 
41F with PMHx of EtOH use disorder (now relapsed), cirrhosis, IVDU (heroin), HTN p/w abdominal pain admitted for alcohol detoxification and hepatic cirrhosis with reported hematemesis and jaundice.

## 2022-02-18 NOTE — ED ADULT TRIAGE NOTE - LOCATION:
Left arm;
BIBA due to altered mental status. As per EMS, pt went home with laceration on left eyebrow. Unable to get hx from pt.

## 2022-03-30 NOTE — ED ADULT NURSE NOTE - DRUG PRE-SCREENING (DAST -1)
[employer] rep called again - she was advised we did send the orders to  Im[facility] in [place]. She asked that we call [facility] and get the authorization for that site and Angelica MOORE (Gabriela) was advised that  [facility] would be responsible for obtaining their own authorizations and we would not do that for them.    
Discussed with Dr. barksdale's office and they are comfortable with the CT Screening as ordered.  Orders faxed to  imaging and message sent to the patient.  
Received a call from [employer] indicating the patient had been declined for the location at the CT (it cannot be done at [place] needs to be done at [facility]).      Discussed with the patient, reviewed the chart and discussed with the insurance company.  The study may be needed by Dr. Ring and we will send the message on to the provider and ask for them to place the order that is needed and send it on to  [facility].    Patient has  Been advised that we can certainly send those orders on but he would need to make sure he gets / brings a CD with the images so this can be entered into his chart.      Patient waiting to hear what is needed and will get the study if required.  
Spoke with Flory at  [facility] and confirmed the reason for the Lung Screening is annual screening and not being done because of the empyema but Dr. Ring will take a look at it when it is done.    
Statement Selected

## 2022-08-10 NOTE — H&P ADULT - NSHPREVIEWOFSYSTEMS_GEN_ALL_CORE
Fentanyl changed to 100 mcg  Dilaudid switched to PO   pain management following Constitutional: denies fever, chills, sweating  HEENT: denies headache, dizziness, or lightheadedness  Respiratory: denies SOB, cough, or wheezing  Cardiovascular: denies CP, palpitations  Gastrointestinal: denies nausea, vomiting, diarrhea, constipation, abdominal pain, or bloody stools  Genitourinary: denies painful urination, increased frequency, urgency, or bloody urine  Skin/Breast: denies rashes or itching  Musculoskeletal: denies muscle aches, joint swelling, or muscle weakness  Neurologic: denies loss of sensation, numbness, or tingling  ROS negative except as noted above Constitutional: denies fever, chills, sweating  HEENT: admits to dizziness and lightheadedness   Respiratory: denies SOB, cough, or wheezing  Cardiovascular: denies CP, palpitations  Gastrointestinal: admits to nausea, vomiting, and constipation. denies diarrhea, abdominal pain, or bloody stools  Genitourinary: denies painful urination, increased frequency, urgency, or bloody urine  Skin: denies rashes or itching  Musculoskeletal: denies muscle aches, joint swelling, or muscle weakness  Neurologic: admits to shaking, likely 2/2 to alcohol withdrawal

## 2023-08-21 NOTE — PATIENT PROFILE ADULT - NSPROGENSOURCEINFO_GEN_A_NUR
Cystogram is scheduled for 8/23. Sending to clinical just in case it needs to be monitored to be read on time. patient/health record

## 2024-05-02 NOTE — BEHAVIORAL HEALTH ASSESSMENT NOTE - NSBHCHARTREVIEWLAB_PSY_A_CORE FT
11.6   6.37  )-----------( 184      ( 2019 15:27 )             33.8   -13  130<L>  |  93<L>  |  12.0  ----------------------------<  106  4.5   |  25.0  |  0.46<L>  Ca    8.5<L>      2019 15:27  Mg     1.9       TPro  7.1  /  Alb  2.7<L>  /  TBili  1.9  /  DBili  x   /  AST  147<H>  /  ALT  73<H>  /  AlkPhos  137<H>      LIVER FUNCTIONS - ( 2019 15:27 )  Alb: 2.7 g/dL / Pro: 7.1 g/dL / ALK PHOS: 137 U/L / ALT: 73 U/L / AST: 147 U/L / GGT: x         Urinalysis Basic - ( 2019 23:06 )  Color: Yellow / Appearance: Clear / S.010 / pH: x  Gluc: x / Ketone: Negative  / Bili: Negative / Urobili: 1 mg/dL   Blood: x / Protein: Negative mg/dL / Nitrite: Negative   Leuk Esterase: Trace / RBC: 0-2 /HPF / WBC 3-5   Sq Epi: x / Non Sq Epi: Moderate / Bacteria: Negative    Ammonia, Serum: 58: (19 @ 16:59)  Alcohol, Blood: <10: (19 @ 15:27)  Methadone, Urine: Positive (19 @ 23:07) Pt presents to the ED via ambulance s/p vomiting.

## 2024-05-05 NOTE — H&P ADULT - PROBLEM/PLAN-1
-phosphorus level elevated at 5.8 on admission   -likely elevated in the setting of acute renal failure   -nephrology consulted   -monitor level     DISPLAY PLAN FREE TEXT

## 2024-08-05 NOTE — H&P ADULT - NSTOBACCOEDUHP_GEN_A_CS
"Presents to triage with c/o mid sternal chest \"pressure\" and SOB that started yesterday. Denies any cardiac or pulmonary hx.         " Offered and provided

## 2025-02-26 NOTE — DISCHARGE NOTE ADULT - NS DC ANGIO PCI YN
----- Message from Karma Villarreal MD sent at 2/26/2025  2:42 PM CST -----  Pap smear by Dr. Tobar, please retrieve   no

## 2025-04-30 NOTE — PATIENT PROFILE ADULT - FALL HARM RISK - PATIENT NEEDS ASSISTANCE
Malgorzata is calling from [facility], operated by [facility] stating they are at capacity and unable to accept the referral at this time.       Malgorzata- [phone]   No assistance needed